# Patient Record
Sex: FEMALE | Race: WHITE | NOT HISPANIC OR LATINO | Employment: FULL TIME | URBAN - METROPOLITAN AREA
[De-identification: names, ages, dates, MRNs, and addresses within clinical notes are randomized per-mention and may not be internally consistent; named-entity substitution may affect disease eponyms.]

---

## 2017-01-02 ENCOUNTER — OFFICE VISIT (OUTPATIENT)
Dept: FAMILY MEDICINE | Facility: CLINIC | Age: 37
End: 2017-01-02
Payer: COMMERCIAL

## 2017-01-02 VITALS
BODY MASS INDEX: 21.09 KG/M2 | WEIGHT: 119 LBS | HEIGHT: 63 IN | HEART RATE: 66 BPM | DIASTOLIC BLOOD PRESSURE: 50 MMHG | OXYGEN SATURATION: 98 % | SYSTOLIC BLOOD PRESSURE: 86 MMHG | TEMPERATURE: 98.3 F

## 2017-01-02 DIAGNOSIS — Z30.40 ENCOUNTER FOR SURVEILLANCE OF CONTRACEPTIVES: ICD-10-CM

## 2017-01-02 DIAGNOSIS — Z00.00 ROUTINE HEALTH MAINTENANCE: Primary | ICD-10-CM

## 2017-01-02 PROCEDURE — 99395 PREV VISIT EST AGE 18-39: CPT | Performed by: INTERNAL MEDICINE

## 2017-01-02 RX ORDER — LEVONORGESTREL/ETHIN.ESTRADIOL 0.1-0.02MG
1 TABLET ORAL DAILY
Qty: 84 TABLET | Refills: 3 | Status: SHIPPED | OUTPATIENT
Start: 2017-01-02 | End: 2017-11-13

## 2017-01-02 NOTE — PROGRESS NOTES
"   SUBJECTIVE:     CC: Monica Quesada is an 36 year old woman who presents for preventive health visit.     Healthy Habits:    Do you get at least three servings of calcium containing foods daily (dairy, green leafy vegetables, etc.)? {YES/NO, DAIRY INTAKE:020729::\"yes\"}    Amount of exercise or daily activities, outside of work: {AMOUNT EXERCISE:190736}    Problems taking medications regularly {Yes /No default:685495::\"No\"}    Medication side effects: {Yes /No default.:315337::\"No\"}    Have you had an eye exam in the past two years? {YESNOBLANK:630134}    Do you see a dentist twice per year? {YESNOBLANK:790216}    Do you have sleep apnea, excessive snoring or daytime drowsiness?{YESNOBLANK:372732}    {Outside tests to abstract? :853753}    {additional problems to add:080183}    Today's PHQ-2 Score:   PHQ-2 ( 1999 Pfizer) 12/29/2016 11/23/2015   Q1: Little interest or pleasure in doing things - 0   Q2: Feeling down, depressed or hopeless - 0   PHQ-2 Score - 0   Little interest or pleasure in doing things Not at all -   Feeling down, depressed or hopeless Not at all -   PHQ-2 Score 0 -     {PHQ-2 LOOK IN ASSESSMENTS :885180}  Abuse: Current or Past(Physical, Sexual or Emotional)- {YES/NO/NA:042579}  Do you feel safe in your environment - {YES/NO/NA:004254}    Social History   Substance Use Topics     Smoking status: Never Smoker      Smokeless tobacco: Never Used     Alcohol Use: No      Comment: 1 per month     {ETOH AUDIT:462952}    Recent Labs   Lab Test  11/23/15   0732   CHOL  197   HDL  56   LDL  120*   TRIG  103   NHDL  141*       Reviewed orders with patient.  Reviewed health maintenance and updated orders accordingly - {Yes/No:543342::\"Yes\"}    Mammo Decision Support:  {Mammo:576393}    Pertinent mammograms are reviewed under the imaging tab.  History of abnormal Pap smear: {PAP HX:209299}  All Histories reviewed and updated in Epic.  {HISTORY OPTIONS:168949}    ROS:  {FEMALE PREVENTATIVE " "ROS:869351}    {CHRONICPROBDATA:335535}  OBJECTIVE:     There were no vitals taken for this visit.  EXAM:  {Exam Choices:041352}    ASSESSMENT/PLAN:     {Diag Picklist:102502}    COUNSELING:   {FEMALE COUNSELING MESSAGES:413909::\"Reviewed preventive health counseling, as reflected in patient instructions\"}    {Blood Pressure Screenin}     reports that she has never smoked. She has never used smokeless tobacco.  {Tobacco Cessation needed for ACO -- Delete if patient is a non-smoker:296708}  Estimated body mass index is 20.87 kg/(m^2) as calculated from the following:    Height as of 11/23/15: 5' 2.5\" (1.588 m).    Weight as of 11/23/15: 116 lb (52.617 kg).   {Weight Management Plan needed for ACO:982677}    Counseling Resources:  ATP IV Guidelines  Pooled Cohorts Equation Calculator  Breast Cancer Risk Calculator  FRAX Risk Assessment  ICSI Preventive Guidelines  Dietary Guidelines for Americans,   USDA's MyPlate  ASA Prophylaxis  Lung CA Screening    Lashell Etienne MD  [facility] [place]  "

## 2017-01-02 NOTE — NURSING NOTE
"Chief Complaint   Patient presents with     Physical     fasting     Initial BP 86/50 mmHg  Pulse 66  Temp(Src) 98.3  F (36.8  C) (Oral)  Ht 5' 2.5\" (1.588 m)  Wt 119 lb (53.978 kg)  BMI 21.41 kg/m2  SpO2 98%  LMP 12/08/2016 Estimated body mass index is 21.41 kg/(m^2) as calculated from the following:    Height as of this encounter: 5' 2.5\" (1.588 m).    Weight as of this encounter: 119 lb (53.978 kg).  BP completed using cuff size regular right Arm  Lacie Auuth CMA    "

## 2017-01-02 NOTE — PROGRESS NOTES
SUBJECTIVE:     CC: Monica Quesada is an 36 year old woman who presents for preventive health visit.     Monica lives with her  and two kids, ages 6 and 3.  She works as an  at [employer].      Physical  Annual:     Getting at least 3 servings of Calcium per day::  Yes    Bi-annual eye exam::  Yes    Dental care twice a year::  Yes    Sleep apnea or symptoms of sleep apnea::  None    Diet::  Regular (no restrictions)    Frequency of exercise::  4-5 days/week    Duration of exercise::  30-45 minutes    Taking medications regularly::  Yes    Medication side effects::  None    Additional concerns today::  No          Today's PHQ-2 Score:   PHQ-2 ( 1999 Pfizer) 1/2/2017   Q1: Little interest or pleasure in doing things 0   Q2: Feeling down, depressed or hopeless 0   PHQ-2 Score 0   Little interest or pleasure in doing things -   Feeling down, depressed or hopeless -   PHQ-2 Score -       Abuse: Current or Past(Physical, Sexual or Emotional)- No  Do you feel safe in your environment - Yes    Social History   Substance Use Topics     Smoking status: Never Smoker      Smokeless tobacco: Never Used     Alcohol Use: No      Comment: 1 per month     The patient does not drink >3 drinks per day nor >7 drinks per week.    Recent Labs   Lab Test  11/23/15   0732   CHOL  197   HDL  56   LDL  120*   TRIG  103   NHDL  141*       Reviewed orders with patient.  Reviewed health maintenance and updated orders accordingly - Yes    Mammo Decision Support:  Mammogram not appropriate for this patient based on age.    History of abnormal Pap smear: NO - age 30-65 PAP every 5 years with negative HPV co-testing recommended    All Histories reviewed and updated in Epic.      ROS:  C: NEGATIVE for fever, chills, change in weight  I: NEGATIVE for worrisome rashes, moles or lesions  E: NEGATIVE for vision changes or irritation  ENT: NEGATIVE for ear, mouth and throat problems  R: NEGATIVE for significant cough or  "SOB  B: NEGATIVE for masses, tenderness or discharge  CV: NEGATIVE for chest pain, palpitations or peripheral edema  GI: NEGATIVE for nausea, abdominal pain, heartburn, or change in bowel habits  : NEGATIVE for unusual urinary or vaginal symptoms. Periods are regular.  M: NEGATIVE for significant arthralgias or myalgia  N: NEGATIVE for weakness, dizziness or paresthesias  P: NEGATIVE for changes in mood or affect    Problem list, Medication list, Allergies, and Medical/Social/Surgical histories reviewed in [facility] and updated as appropriate.  OBJECTIVE:     BP 86/50 mmHg  Pulse 66  Temp(Src) 98.3  F (36.8  C) (Oral)  Ht 5' 2.5\" (1.588 m)  Wt 119 lb (53.978 kg)  BMI 21.41 kg/m2  SpO2 98%  LMP 12/08/2016  EXAM:  GENERAL: healthy, alert and no distress  EYES: Eyes grossly normal to inspection, PERRL and conjunctivae and sclerae normal  HENT: ear canals and TM's normal,  mouth without ulcers or lesions  NECK: no adenopathy, no asymmetry, masses, or scars and thyroid normal to palpation  RESP: lungs clear to auscultation - no rales, rhonchi or wheezes  CV: regular rate and rhythm, normal S1 S2, no S3 or S4, no murmur, click or rub, no peripheral edema and peripheral pulses strong  ABDOMEN: soft, nontender, no hepatosplenomegaly, no masses and bowel sounds normal  MS: no gross musculoskeletal defects noted, no edema  SKIN: no suspicious lesions or rashes  NEURO: Normal strength and tone, mentation intact and speech normal  PSYCH: mentation appears normal, affect normal/bright    ASSESSMENT/PLAN:         ICD-10-CM    1. Routine health maintenance Z00.00    2. Encounter for surveillance of contraceptives Z30.40 levonorgestrel-ethinyl estradiol (LUTERA) 0.1-20 MG-MCG per tablet       COUNSELING:  Reviewed preventive health counseling, as reflected in patient instructions       Regular exercise       Healthy diet/nutrition       Contraception - discussed alternative forms of hormonal and surgical birth control to OCP " "given that she and her  are done having children.  She will think about options and discuss with her .          reports that she has never smoked. She has never used smokeless tobacco.    Estimated body mass index is 21.41 kg/(m^2) as calculated from the following:    Height as of this encounter: 5' 2.5\" (1.588 m).    Weight as of this encounter: 119 lb (53.978 kg).       Counseling Resources:  ATP IV Guidelines  Pooled Cohorts Equation Calculator  Breast Cancer Risk Calculator  FRAX Risk Assessment  ICSI Preventive Guidelines  Dietary Guidelines for Americans, 2010  USDA's MyPlate  ASA Prophylaxis  Lung CA Screening    Lashell Etienne MD  [facility] [place]    "

## 2017-01-02 NOTE — PATIENT INSTRUCTIONS

## 2017-11-13 DIAGNOSIS — Z30.40 ENCOUNTER FOR SURVEILLANCE OF CONTRACEPTIVES: ICD-10-CM

## 2017-11-13 NOTE — TELEPHONE ENCOUNTER
Reason for Call:  Medication or medication refill:    Do you use a [facility]?  Name of the pharmacy and phone number for the current request:   [facility] [id number] [place] [address] [id number] [facility] [address]     Name of the medication requested: The patient says she will run out of birth control (Lutera) before her next appointment. She set up a physical with Dr. Lucero on 01/08/2018.     Can we leave a detailed message on this number? YES    Phone number patient can be reached at: Cell number on file:    Telephone Information:   Mobile [phone]     Best Time: Anytime    Call taken on 11/13/2017 at 2:44 PM by Ewelina Landin

## 2017-11-17 RX ORDER — LEVONORGESTREL/ETHIN.ESTRADIOL 0.1-0.02MG
1 TABLET ORAL DAILY
Qty: 84 TABLET | Refills: 0 | Status: SHIPPED | OUTPATIENT
Start: 2017-11-17 | End: 2018-01-08

## 2017-11-17 NOTE — TELEPHONE ENCOUNTER
Medication is being filled for 1 time refill only due to:  for coverage until next scheduled apt time   Next 5 appointments (look out 90 days)     Jan 08, 2018  8:00 AM CST   PHYSICAL with Shelbie Lucero MD   [facility] ([facility])    [address] [place] [id number]   [phone]                Lorrie Sullivan RN

## 2018-01-08 ENCOUNTER — OFFICE VISIT (OUTPATIENT)
Dept: FAMILY MEDICINE | Facility: CLINIC | Age: 38
End: 2018-01-08
Payer: COMMERCIAL

## 2018-01-08 VITALS
OXYGEN SATURATION: 99 % | SYSTOLIC BLOOD PRESSURE: 104 MMHG | TEMPERATURE: 98.6 F | HEART RATE: 79 BPM | HEIGHT: 63 IN | BODY MASS INDEX: 21.26 KG/M2 | DIASTOLIC BLOOD PRESSURE: 66 MMHG | WEIGHT: 120 LBS

## 2018-01-08 DIAGNOSIS — Z13.6 CARDIOVASCULAR SCREENING; LDL GOAL LESS THAN 160: ICD-10-CM

## 2018-01-08 DIAGNOSIS — Z00.00 ROUTINE GENERAL MEDICAL EXAMINATION AT A HEALTH CARE FACILITY: Primary | ICD-10-CM

## 2018-01-08 DIAGNOSIS — Z30.41 ENCOUNTER FOR SURVEILLANCE OF CONTRACEPTIVE PILLS: ICD-10-CM

## 2018-01-08 LAB
CHOLEST SERPL-MCNC: 218 MG/DL
GLUCOSE SERPL-MCNC: 86 MG/DL (ref 70–99)
HDLC SERPL-MCNC: 64 MG/DL
LDLC SERPL CALC-MCNC: 133 MG/DL
NONHDLC SERPL-MCNC: 154 MG/DL
TRIGL SERPL-MCNC: 106 MG/DL

## 2018-01-08 PROCEDURE — 87624 HPV HI-RISK TYP POOLED RSLT: CPT | Performed by: FAMILY MEDICINE

## 2018-01-08 PROCEDURE — 80061 LIPID PANEL: CPT | Performed by: FAMILY MEDICINE

## 2018-01-08 PROCEDURE — 36415 COLL VENOUS BLD VENIPUNCTURE: CPT | Performed by: FAMILY MEDICINE

## 2018-01-08 PROCEDURE — 82947 ASSAY GLUCOSE BLOOD QUANT: CPT | Performed by: FAMILY MEDICINE

## 2018-01-08 PROCEDURE — G0145 SCR C/V CYTO,THINLAYER,RESCR: HCPCS | Performed by: FAMILY MEDICINE

## 2018-01-08 PROCEDURE — 99395 PREV VISIT EST AGE 18-39: CPT | Performed by: FAMILY MEDICINE

## 2018-01-08 RX ORDER — LEVONORGESTREL/ETHIN.ESTRADIOL 0.1-0.02MG
1 TABLET ORAL DAILY
Qty: 84 TABLET | Refills: 3 | Status: SHIPPED | OUTPATIENT
Start: 2018-01-08 | End: 2019-01-26

## 2018-01-08 NOTE — MR AVS SNAPSHOT
After Visit Summary   1/8/2018    Monica Quesada    MRN: [id number]           Patient Information     Date Of Birth          1980        Visit Information        Provider Department      1/8/2018 8:00 AM Shelbie Lucero MD [facility]        Today's Diagnoses     Routine general medical examination at a health care facility    -  1    Encounter for surveillance of contraceptive pills        CARDIOVASCULAR SCREENING; LDL GOAL LESS THAN 160          Care Instructions      Preventive Health Recommendations  Female Ages 26 - 39  Yearly exam:   See your health care provider every year in order to    Review health changes.     Discuss preventive care.      Review your medicines if you your doctor has prescribed any.    Until age 30: Get a Pap test every three years (more often if you have had an abnormal result).    After age 30: Talk to your doctor about whether you should have a Pap test every 3 years or have a Pap test with HPV screening every 5 years.   You do not need a Pap test if your uterus was removed (hysterectomy) and you have not had cancer.  You should be tested each year for STDs (sexually transmitted diseases), if you're at risk.   Talk to your provider about how often to have your cholesterol checked.  If you are at risk for diabetes, you should have a diabetes test (fasting glucose).  Shots: Get a flu shot each year. Get a tetanus shot every 10 years.   Nutrition:     Eat at least 5 servings of fruits and vegetables each day.    Eat whole-grain bread, whole-wheat pasta and brown rice instead of white grains and rice.    Talk to your provider about Calcium and Vitamin D.     Lifestyle    Exercise at least 150 minutes a week (30 minutes a day, 5 days of the week). This will help you control your weight and prevent disease.    Limit alcohol to one drink per day.    No smoking.     Wear sunscreen to prevent skin cancer.    See your dentist every six months  "for an exam and cleaning.            Follow-ups after your visit        Who to contact     If you have questions or need follow up information about today's clinic visit or your schedule please contact [facility] [place] directly at [phone].  Normal or non-critical lab and imaging results will be communicated to you by Attivio[employer], letter or phone within 4 business days after the clinic has received the results. If you do not hear from us within 7 days, please contact the clinic through Attiviohart or phone. If you have a critical or abnormal lab result, we will notify you by phone as soon as possible.  Submit refill requests through [employer] or call your pharmacy and they will forward the refill request to us. Please allow 3 business days for your refill to be completed.          Additional Information About Your Visit        [employer] Information     [employer] gives you secure access to your electronic health record. If you see a primary care provider, you can also send messages to your care team and make appointments. If you have questions, please call your primary care clinic.  If you do not have a primary care provider, please call [phone] and they will assist you.        Care EveryWhere ID     This is your Care EveryWhere ID. This could be used by other organizations to access your [facility] medical records  [id number]        Your Vitals Were     Pulse Temperature Height Last Period Pulse Oximetry BMI (Body Mass Index)    79 98.6  F (37  C) (Tympanic) 5' 2.5\" (1.588 m) 01/03/2018 99% 21.6 kg/m2       Blood Pressure from Last 3 Encounters:   01/08/18 104/66   01/02/17 (!) 86/50   11/23/15 99/65    Weight from Last 3 Encounters:   01/08/18 120 lb (54.4 kg)   01/02/17 119 lb (54 kg)   11/23/15 116 lb (52.6 kg)              We Performed the Following     Glucose     HPV High Risk Types DNA Cervical     Lipid panel reflex to direct LDL Fasting     Pap imaged thin layer screen with HPV - recommended age 30 " - 65          Where to get your medicines      These medications were sent to [id number] - [place], [place] - [address] AT [facility] [place] [address]  [address] [place] [address]    Hours:  24-hours Phone:  [phone]     levonorgestrel-ethinyl estradiol 0.1-20 MG-MCG per tablet          Primary Care Provider Office Phone # Fax #    Shelbie Davy Lucero [phone]        [facility] [address]  [place] [address]        Equal Access to Services     [facility]: Hadii aad ku hadasho Soomaali, waaxda luqadaha, qaybta kaalmada adeegyada, waxcadence bauman haymariusz caruso . So [facility] [phone].    ATENCIÓN: Si habla español, tiene a barraza disposición servicios gratuitos de asistencia lingüística. [facility] [phone].    We comply with applicable federal civil rights laws and [place] laws. We do not discriminate on the basis of race, color, national origin, age, disability, sex, sexual orientation, or gender identity.            Thank you!     Thank you for choosing [facility] [place]  for your care. Our goal is always to provide you with excellent care. Hearing back from our patients is one way we can continue to improve our services. Please take a few minutes to complete the written survey that you may receive in the mail after your visit with us. Thank you!             Your Updated Medication List - Protect others around you: Learn how to safely use, store and throw away your medicines at www.disposemymeds.org.          This list is accurate as of: 1/8/18  8:37 AM.  Always use your most recent med list.                   Brand Name Dispense Instructions for use Diagnosis    levonorgestrel-ethinyl estradiol 0.1-20 MG-MCG per tablet    LUTERA    84 tablet    Take 1 tablet by mouth daily    Encounter for surveillance of contraceptive pills

## 2018-01-08 NOTE — NURSING NOTE
"Chief Complaint   Patient presents with     Physical       Initial /66  Pulse 79  Temp 98.6  F (37  C) (Tympanic)  Ht 5' 2.5\" (1.588 m)  Wt 120 lb (54.4 kg)  LMP 01/03/2018  SpO2 99%  BMI 21.6 kg/m2 Estimated body mass index is 21.6 kg/(m^2) as calculated from the following:    Height as of this encounter: 5' 2.5\" (1.588 m).    Weight as of this encounter: 120 lb (54.4 kg).  Medication Reconciliation: complete  "

## 2018-01-08 NOTE — PROGRESS NOTES
SUBJECTIVE:   CC: Monica Quesada is an 37 year old woman who presents for preventive health visit.     Healthy Habits:    Do you get at least three servings of calcium containing foods daily (dairy, green leafy vegetables, etc.)? yes    Amount of exercise or daily activities, outside of work: 4-6 day(s) per week    Problems taking medications regularly not applicable    Medication side effects: No    Have you had an eye exam in the past two years? yes    Do you see a dentist twice per year? yes    Do you have sleep apnea, excessive snoring or daytime drowsiness?no        PROBLEMS TO ADD ON...     Need refill on BCP. Working well and wish  to continue     Today's PHQ-2 Score:   PHQ-2 ( 1999 Pfizer) 1/8/2018 1/2/2017   Q1: Little interest or pleasure in doing things 0 0   Q2: Feeling down, depressed or hopeless 0 0   PHQ-2 Score 0 0   Q1: Little interest or pleasure in doing things - -   Q2: Feeling down, depressed or hopeless - -   PHQ-2 Score - -         Abuse: Current or Past(Physical, Sexual or Emotional)- No  Do you feel safe in your environment - Yes  Social History   Substance Use Topics     Smoking status: Never Smoker     Smokeless tobacco: Never Used     Alcohol use No      Comment: 1 per month     If you drink alcohol do you typically have >3 drinks per day or >7 drinks per week? No                     Reviewed orders with patient.  Reviewed health maintenance and updated orders accordingly - Yes  Patient Active Problem List   Diagnosis     Encounter for surveillance of contraceptives     CARDIOVASCULAR SCREENING; LDL GOAL LESS THAN 160     Past Surgical History:   Procedure Laterality Date     ADENOIDECTOMY  1990     EYE SURGERY  2016    Lasik     PE TUBES  1990     TONSILLECTOMY  4/2007    Bilateral tonsillectomy       Social History   Substance Use Topics     Smoking status: Never Smoker     Smokeless tobacco: Never Used     Alcohol use No      Comment: 1 per month     Family History   Problem  Relation Age of Onset     Genitourinary Problems Mother      hysterectomy for benign disease     Lipids Mother      Hypertension Mother      Hyperlipidemia Mother      Asthma Mother      Lipids Father      Hyperlipidemia Father      Blood Disease Maternal Grandmother      low B12     Depression Maternal Grandmother      depression     Thyroid Disease Maternal Grandmother      hypothyroidism     C.A.D. Maternal Grandfather      sudden death, mid 50s, smoker     CEREBROVASCULAR DISEASE Paternal Grandfather      onset age 70s     Lipids Sister      Genitourinary Problems Sister      kidney stones     Cardiovascular Maternal Aunt      DVT legs     Hyperlipidemia Sister      Breast Cancer No family hx of      Colon Cancer No family hx of              Mammogram not appropriate for this patient based on age.    Pertinent mammograms are reviewed under the imaging tab.  History of abnormal Pap smear: NO - age 30- 65 PAP every 3 years recommended    Reviewed and updated as needed this visit by clinical staff         Reviewed and updated as needed this visit by Provider        Past Medical History:   Diagnosis Date     Allergic rhinitis     on immunotherapy until      Chronic eustachian tube dysfunction      Chronic tonsillitis 2007    tonsillectomy     Mixed hearing loss, bilateral     mild     Mixed hyperlipidemia 2007    mild LDL elevation     Supervision of other normal pregnancy      - vaginal        ROS:  C: NEGATIVE for fever, chills, change in weight  I: NEGATIVE for worrisome rashes, moles or lesions  E: NEGATIVE for vision changes or irritation  ENT: NEGATIVE for ear, mouth and throat problems  R: NEGATIVE for significant cough or SOB  B: NEGATIVE for masses, tenderness or discharge  CV: NEGATIVE for chest pain, palpitations or peripheral edema  GI: NEGATIVE for nausea, abdominal pain, heartburn, or change in bowel habits  : NEGATIVE for unusual urinary or vaginal symptoms. Periods are regular.  M:  NEGATIVE for significant arthralgias or myalgia  N: NEGATIVE for weakness, dizziness or paresthesias  E: NEGATIVE for temperature intolerance, skin/hair changes  P: NEGATIVE for changes in mood or affect    OBJECTIVE:   There were no vitals taken for this visit.  EXAM:  GENERAL: healthy, alert and no distress  EYES: Eyes grossly normal to inspection, PERRL and conjunctivae and sclerae normal  HENT: ear canals and TM's normal, nose and mouth without ulcers or lesions  NECK: no adenopathy, no asymmetry, masses, or scars and thyroid normal to palpation  RESP: lungs clear to auscultation - no rales, rhonchi or wheezes  BREAST: normal without masses, tenderness or nipple discharge and no palpable axillary masses or adenopathy  CV: regular rate and rhythm, normal S1 S2, no S3 or S4, no murmur, click or rub, no peripheral edema and peripheral pulses strong  ABDOMEN: soft, nontender, no hepatosplenomegaly, no masses and bowel sounds normal   (female): normal female external genitalia, normal urethral meatus , vaginal mucosa pink, moist, well rugated and normal cervix, adnexae, and uterus without masses.  MS: no gross musculoskeletal defects noted, no edema  SKIN: no suspicious lesions or rashes  NEURO: Normal strength and tone, mentation intact and speech normal  PSYCH: mentation appears normal, affect normal/bright    ASSESSMENT/PLAN:   (Z00.00) Routine general medical examination at a health care facility  (primary encounter diagnosis)  Comment:   Plan: Lipid panel reflex to direct LDL Fasting, Pap         imaged thin layer screen with HPV - recommended        age 30 - 65, HPV High Risk Types DNA Cervical,         Glucose            (Z30.41) Encounter for surveillance of contraceptive pills  Comment:   Plan: levonorgestrel-ethinyl estradiol (LUTERA)         0.1-20 MG-MCG per tablet            (Z13.6) CARDIOVASCULAR SCREENING; LDL GOAL LESS THAN 160  Comment:   Plan: Lipid panel reflex to direct LDL Fasting         "      COUNSELING:   Reviewed preventive health counseling, as reflected in patient instructions       Regular exercise       Healthy diet/nutrition       Vision screening         reports that she has never smoked. She has never used smokeless tobacco.    Estimated body mass index is 21.42 kg/(m^2) as calculated from the following:    Height as of 1/2/17: 5' 2.5\" (1.588 m).    Weight as of 1/2/17: 119 lb (54 kg).         Counseling Resources:  ATP IV Guidelines  Pooled Cohorts Equation Calculator  Breast Cancer Risk Calculator  FRAX Risk Assessment  ICSI Preventive Guidelines  Dietary Guidelines for Americans, 2010  USDA's MyPlate  ASA Prophylaxis  Lung CA Screening    Shelbie Lucero MD  [facility]  "

## 2018-01-10 LAB
COPATH REPORT: NORMAL
PAP: NORMAL

## 2018-01-12 LAB
FINAL DIAGNOSIS: NORMAL
HPV HR 12 DNA CVX QL NAA+PROBE: NEGATIVE
HPV16 DNA SPEC QL NAA+PROBE: NEGATIVE
HPV18 DNA SPEC QL NAA+PROBE: NEGATIVE
SPECIMEN DESCRIPTION: NORMAL

## 2018-02-25 DIAGNOSIS — Z30.40 ENCOUNTER FOR SURVEILLANCE OF CONTRACEPTIVES: ICD-10-CM

## 2018-02-26 RX ORDER — LEVONORGESTREL AND ETHINYL ESTRADIOL 0.1-0.02MG
KIT ORAL
Qty: 84 TABLET | Refills: 0 | OUTPATIENT
Start: 2018-02-26

## 2018-02-26 NOTE — TELEPHONE ENCOUNTER
"Requested Prescriptions   Pending Prescriptions Disp Refills     AVIANE 0.1-20 MG-MCG per tablet [Pharmacy Med Name: AVIANE-28 TABLETS 28]  Last Written Prescription Date:  1/8/18  Last Fill Quantity: 84,  # refills: 3   Last office visit: 1/8/2018 with prescribing provider:  Nic   Future Office Visit:     84 tablet 0     Sig: TAKE 1 TABLET BY MOUTH DAILY    Contraceptives Protocol Failed    2/25/2018 10:04 AM       Failed - No positive pregnancy test in past 12 months       Passed - Patient is not a current smoker if age is 35 or older       Passed - Recent or future visit with authorizing provider's specialty    Patient had office visit in the last year or has a visit in the next 30 days with authorizing provider.  See \"Patient Info\" tab in inbasket, or \"Choose Columns\" in Meds & Orders section of the refill encounter.            Passed - No active pregnancy on record          "

## 2019-01-26 DIAGNOSIS — Z30.41 ENCOUNTER FOR SURVEILLANCE OF CONTRACEPTIVE PILLS: ICD-10-CM

## 2019-01-28 RX ORDER — LEVONORGESTREL AND ETHINYL ESTRADIOL 0.1-0.02MG
KIT ORAL
Qty: 28 TABLET | Refills: 0 | Status: SHIPPED | OUTPATIENT
Start: 2019-01-28 | End: 2019-02-11

## 2019-02-11 ENCOUNTER — TELEPHONE (OUTPATIENT)
Dept: OTHER | Facility: CLINIC | Age: 39
End: 2019-02-11

## 2019-02-11 ENCOUNTER — OFFICE VISIT (OUTPATIENT)
Dept: FAMILY MEDICINE | Facility: CLINIC | Age: 39
End: 2019-02-11
Payer: COMMERCIAL

## 2019-02-11 VITALS
TEMPERATURE: 98.5 F | OXYGEN SATURATION: 98 % | WEIGHT: 120 LBS | HEIGHT: 63 IN | HEART RATE: 69 BPM | SYSTOLIC BLOOD PRESSURE: 104 MMHG | BODY MASS INDEX: 21.26 KG/M2 | DIASTOLIC BLOOD PRESSURE: 60 MMHG

## 2019-02-11 DIAGNOSIS — I83.92: ICD-10-CM

## 2019-02-11 DIAGNOSIS — Z00.00 ANNUAL PHYSICAL EXAM: Primary | ICD-10-CM

## 2019-02-11 DIAGNOSIS — Z30.41 ENCOUNTER FOR SURVEILLANCE OF CONTRACEPTIVE PILLS: ICD-10-CM

## 2019-02-11 LAB
CHOLEST SERPL-MCNC: 205 MG/DL
GLUCOSE SERPL-MCNC: 97 MG/DL (ref 70–99)
HDLC SERPL-MCNC: 66 MG/DL
LDLC SERPL CALC-MCNC: 119 MG/DL
NONHDLC SERPL-MCNC: 139 MG/DL
TRIGL SERPL-MCNC: 98 MG/DL

## 2019-02-11 PROCEDURE — 80061 LIPID PANEL: CPT | Performed by: FAMILY MEDICINE

## 2019-02-11 PROCEDURE — 99395 PREV VISIT EST AGE 18-39: CPT | Performed by: FAMILY MEDICINE

## 2019-02-11 PROCEDURE — 82947 ASSAY GLUCOSE BLOOD QUANT: CPT | Performed by: FAMILY MEDICINE

## 2019-02-11 PROCEDURE — 36415 COLL VENOUS BLD VENIPUNCTURE: CPT | Performed by: FAMILY MEDICINE

## 2019-02-11 RX ORDER — LEVONORGESTREL/ETHIN.ESTRADIOL 0.1-0.02MG
1 TABLET ORAL DAILY
Qty: 28 TABLET | Refills: 2 | Status: SHIPPED | OUTPATIENT
Start: 2019-02-11 | End: 2019-05-25

## 2019-02-11 ASSESSMENT — MIFFLIN-ST. JEOR: SCORE: 1188.95

## 2019-02-11 NOTE — PROGRESS NOTES
SUBJECTIVE:   CC: Monica Quesada is an 38 year old woman who presents for preventive health visit.     Healthy Habits:    Do you get at least three servings of calcium containing foods daily (dairy, green leafy vegetables, etc.)? yes    Amount of exercise or daily activities, outside of work: 3-5 day(s) per week    Problems taking medications regularly No    Medication side effects: No    Have you had an eye exam in the past two years? yes    Do you see a dentist twice per year? yes    Do you have sleep apnea, excessive snoring or daytime drowsiness?no      Patient needs a refill on OCPs.  Has been just had a vasectomy.  She thinks she would need may be another 1-2 months more of the medication and then she would like to stop the medication.    Also reports of varicose veins on the left leg above and below the knee.  It does not swell or ache.  It itches in that area once in a while.  Her mother also has varicose veins.  Would like to see a vein specialist.    Today's PHQ-2 Score:   PHQ-2 ( 1999 [employer]) 2/11/2019 1/8/2018   Q1: Little interest or pleasure in doing things 0 0   Q2: Feeling down, depressed or hopeless 0 0   PHQ-2 Score 0 0   Q1: Little interest or pleasure in doing things - -   Q2: Feeling down, depressed or hopeless - -   PHQ-2 Score - -       Abuse: Current or Past(Physical, Sexual or Emotional)- No  Do you feel safe in your environment? Yes    Social History     Tobacco Use     Smoking status: Never Smoker     Smokeless tobacco: Never Used   Substance Use Topics     Alcohol use: No     Comment: 1 per month     If you drink alcohol do you typically have >3 drinks per day or >7 drinks per week? No                     Reviewed orders with patient.  Reviewed health maintenance and updated orders accordingly - Yes  Labs reviewed in EPIC  BP Readings from Last 3 Encounters:   02/11/19 104/60   01/08/18 104/66   01/02/17 (!) 86/50    Wt Readings from Last 3 Encounters:   02/11/19 54.4 kg (120 lb)    01/08/18 54.4 kg (120 lb)   01/02/17 54 kg (119 lb)                  Patient Active Problem List   Diagnosis     Encounter for surveillance of contraceptives     CARDIOVASCULAR SCREENING; LDL GOAL LESS THAN 160     Past Surgical History:   Procedure Laterality Date     ADENOIDECTOMY  1990     EYE SURGERY  2016    Lasik     PE TUBES  1990     TONSILLECTOMY  4/2007    Bilateral tonsillectomy       Social History     Tobacco Use     Smoking status: Never Smoker     Smokeless tobacco: Never Used   Substance Use Topics     Alcohol use: No     Comment: 1 per month     Family History   Problem Relation Age of Onset     Genitourinary Problems Mother         hysterectomy for benign disease     Lipids Mother      Hypertension Mother      Hyperlipidemia Mother      Asthma Mother      Lipids Father      Hyperlipidemia Father      Blood Disease Maternal Grandmother         low B12     Depression Maternal Grandmother         depression     Thyroid Disease Maternal Grandmother         hypothyroidism     C.A.D. Maternal Grandfather         sudden death, mid 50s, smoker     Cerebrovascular Disease Paternal Grandfather         onset age 70s     Lipids Sister      Genitourinary Problems Sister         kidney stones     Cardiovascular Maternal Aunt         DVT legs     Hyperlipidemia Sister      Breast Cancer No family hx of      Colon Cancer No family hx of          Current Outpatient Medications   Medication Sig Dispense Refill     levonorgestrel-ethinyl estradiol (LESSINA-28) 0.1-20 MG-MCG tablet Take 1 tablet by mouth daily 28 tablet 2     Allergies   Allergen Reactions     Penicillins      rash     Sulfa Drugs      rash       Mammogram not appropriate for this patient based on age.    Pertinent mammograms are reviewed under the imaging tab.  History of abnormal Pap smear: NO - age 30-65 PAP every 5 years with negative HPV co-testing recommended  PAP / HPV Latest Ref Rng & Units 1/8/2018 8/29/2008 8/7/2007   PAP - NIL NIL NIL  "  HPV 16 DNA NEG:Negative Negative - -   HPV 18 DNA NEG:Negative Negative - -   OTHER HR HPV NEG:Negative Negative - -     Reviewed and updated as needed this visit by clinical staff  Tobacco  Allergies  Meds  Problems  Med Hx  Surg Hx  Fam Hx  Soc Hx          Reviewed and updated as needed this visit by Provider  Tobacco  Allergies  Meds  Problems  Med Hx  Surg Hx  Fam Hx  Soc Hx             ROS:  CONSTITUTIONAL: NEGATIVE for fever, chills, change in weight  INTEGUMENTARU/SKIN: NEGATIVE for worrisome rashes, moles or lesions  EYES: NEGATIVE for vision changes or irritation  ENT: NEGATIVE for ear, mouth and throat problems  RESP: NEGATIVE for significant cough or SOB  BREAST: NEGATIVE for masses, tenderness or discharge  CV: NEGATIVE for chest pain, palpitations or peripheral edema  GI: NEGATIVE for nausea, abdominal pain, heartburn, or change in bowel habits  : NEGATIVE for unusual urinary or vaginal symptoms. Periods are regular.  MUSCULOSKELETAL: NEGATIVE for significant arthralgias or myalgia  NEURO: NEGATIVE for weakness, dizziness or paresthesias  PSYCHIATRIC: NEGATIVE for changes in mood or affect    OBJECTIVE:   /60   Pulse 69   Temp 98.5  F (36.9  C) (Tympanic)   Ht 1.593 m (5' 2.72\")   Wt 54.4 kg (120 lb)   LMP 01/31/2019   SpO2 98%   BMI 21.45 kg/m    EXAM:  GENERAL: healthy, alert and no distress  EYES: Eyes grossly normal to inspection, PERRL and conjunctivae and sclerae normal  HENT: ear canals and TM's normal, nose and mouth without ulcers or lesions  NECK: no adenopathy, no asymmetry, masses, or scars and thyroid normal to palpation  RESP: lungs clear to auscultation - no rales, rhonchi or wheezes  BREAST: normal without masses, tenderness or nipple discharge and no palpable axillary masses or adenopathy  CV: regular rate and rhythm, normal S1 S2, no S3 or S4, no murmur, click or rub, no peripheral edema and peripheral pulses strong  ABDOMEN: soft, nontender, no " "hepatosplenomegaly, no masses and bowel sounds normal  MS: no gross musculoskeletal defects noted, no edema  Left leg on the medial aspect above and below the knee, is noted to have prominent veins.  No bulging noted.  No  tenderness.  No swelling noted.  SKIN: no suspicious lesions or rashes  NEURO: Normal strength and tone, mentation intact and speech normal  PSYCH: mentation appears normal, affect normal/bright        ASSESSMENT/PLAN:   1. Annual physical exam  Screening fasting labs ordered.  - Lipid panel reflex to direct LDL Fasting  - Glucose    2. Encounter for surveillance of contraceptive pills  OCPs refilled.  - levonorgestrel-ethinyl estradiol (LESSINA-28) 0.1-20 MG-MCG tablet; Take 1 tablet by mouth daily  Dispense: 28 tablet; Refill: 2    3. Varicose veins of lower limb without ulcer or inflammation, left  Referring the patient to vascular medicine for evaluation management of varicose veins.  - VASCULAR REFERRAL    COUNSELING:   Reviewed preventive health counseling, as reflected in patient instructions       Regular exercise       Healthy diet/nutrition    BP Readings from Last 1 Encounters:   02/11/19 104/60     Estimated body mass index is 21.45 kg/m  as calculated from the following:    Height as of this encounter: 1.593 m (5' 2.72\").    Weight as of this encounter: 54.4 kg (120 lb).           reports that  has never smoked. she has never used smokeless tobacco.      Counseling Resources:  ATP IV Guidelines  Pooled Cohorts Equation Calculator  Breast Cancer Risk Calculator  FRAX Risk Assessment  ICSI Preventive Guidelines  Dietary Guidelines for Americans, 2010  USDA's MyPlate  ASA Prophylaxis  Lung CA Screening    Deng Santiago MD  [facility] [place]  "

## 2019-02-11 NOTE — TELEPHONE ENCOUNTER
"Referral received via EPIC \"in box\", per  guidelines referral forwarded to [facility].     Katelin Bowden, SUSANN, RN    "

## 2019-02-20 ENCOUNTER — TELEPHONE (OUTPATIENT)
Dept: VASCULAR SURGERY | Facility: CLINIC | Age: 39
End: 2019-02-20

## 2019-02-20 NOTE — TELEPHONE ENCOUNTER
Referral received from [facility] For Left Varicose Veins. Left message for pt. To call to schedule an appt. With [facility].Kady Mishra

## 2019-02-22 ENCOUNTER — OFFICE VISIT (OUTPATIENT)
Dept: FAMILY MEDICINE | Facility: CLINIC | Age: 39
End: 2019-02-22
Payer: COMMERCIAL

## 2019-02-22 VITALS — DIASTOLIC BLOOD PRESSURE: 69 MMHG | HEART RATE: 61 BPM | SYSTOLIC BLOOD PRESSURE: 107 MMHG

## 2019-02-22 DIAGNOSIS — D22.9 MULTIPLE BENIGN NEVI: ICD-10-CM

## 2019-02-22 DIAGNOSIS — L81.4 LENTIGINES: Primary | ICD-10-CM

## 2019-02-22 DIAGNOSIS — D18.01 CHERRY ANGIOMA: ICD-10-CM

## 2019-02-22 PROCEDURE — 99213 OFFICE O/P EST LOW 20 MIN: CPT | Performed by: PHYSICIAN ASSISTANT

## 2019-02-22 NOTE — LETTER
2019         RE: Monica Quesada  [address] [id number]        Dear Colleague,    Thank you for referring your patient, Monica Quesada, [place] [facility] Please see a copy of my visit note below.    HPI:  Monica Quesada is a 38 year old female patient here today for FBE. Has had moles on her trunk since she was younger with not change .  Patient states this has been present for years.  Patient reports the following symptoms: none .  Patient reports the following previous treatments: none.  Patient reports the following modifying factors: none.  Associated symptoms: none.  Patient has no other skin complaints today.  Remainder of the HPI, Meds, PMH, Allergies, FH, and SH was reviewed in chart.    Pertinent Hx:   No personal or family history of skin cancer    Past Medical History:   Diagnosis Date     Allergic rhinitis     on immunotherapy until      Chronic eustachian tube dysfunction      Chronic tonsillitis     tonsillectomy     Mixed hearing loss, bilateral     mild     Mixed hyperlipidemia     mild LDL elevation     Supervision of other normal pregnancy      - vaginal       Past Surgical History:   Procedure Laterality Date     ADENOIDECTOMY       EYE SURGERY      Lasik     PE TUBES       TONSILLECTOMY  2007    Bilateral tonsillectomy        Family History   Problem Relation Age of Onset     Genitourinary Problems Mother         hysterectomy for benign disease     Lipids Mother      Hypertension Mother      Hyperlipidemia Mother      Asthma Mother      Lipids Father      Hyperlipidemia Father      Blood Disease Maternal Grandmother         low B12     Depression Maternal Grandmother         depression     Thyroid Disease Maternal Grandmother         hypothyroidism     C.A.D. Maternal Grandfather         sudden death, mid 50s, smoker     Cerebrovascular Disease Paternal Grandfather         onset age  70s     Lipids Sister      Genitourinary Problems Sister         kidney stones     Cardiovascular Maternal Aunt         DVT legs     Hyperlipidemia Sister      Breast Cancer No family hx of      Colon Cancer No family hx of        Social History     Socioeconomic History     Marital status:      Spouse name: Laron     Number of children: 0     Years of education: 16     Highest education level: Not on file   Occupational History     Occupation:      Employer: [employer]   Social Needs     Financial resource strain: Not on file     Food insecurity:     Worry: Not on file     Inability: Not on file     Transportation needs:     Medical: Not on file     Non-medical: Not on file   Tobacco Use     Smoking status: Never Smoker     Smokeless tobacco: Never Used   Substance and Sexual Activity     Alcohol use: No     Comment: 1 per month     Drug use: No     Sexual activity: Yes     Partners: Male     Birth control/protection: Pill     Comment:  had a vasectomy   Lifestyle     Physical activity:     Days per week: Not on file     Minutes per session: Not on file     Stress: Not on file   Relationships     Social connections:     Talks on phone: Not on file     Gets together: Not on file     Attends Church service: Not on file     Active member of club or organization: Not on file     Attends meetings of clubs or organizations: Not on file     Relationship status: Not on file     Intimate partner violence:     Fear of current or ex partner: Not on file     Emotionally abused: Not on file     Physically abused: Not on file     Forced sexual activity: Not on file   Other Topics Concern      Service No     Blood Transfusions No     Caffeine Concern No     Comment: 2-3 cans diet coke per week     Occupational Exposure No     Hobby Hazards Yes     Comment: tennis     Sleep Concern No     Stress Concern Yes     Comment: planning a wedding     Weight Concern No     Special Diet No     Comment:  1 yogurt per day     Back Care No     Exercise Yes     Comment: cardio for 60 min 4-5 days per week     Bike Helmet No     Seat Belt Yes     Self-Exams Yes     Parent/sibling w/ CABG, MI or angioplasty before 65F 55M? No   Social History Narrative    Lives with .  2 kids, non smoker  7/2008.  Alcohol social , working full time , Family in [place].       Outpatient Encounter Medications as of 2/22/2019   Medication Sig Dispense Refill     levonorgestrel-ethinyl estradiol (LESSINA-28) 0.1-20 MG-MCG tablet Take 1 tablet by mouth daily 28 tablet 2     No facility-administered encounter medications on file as of 2/22/2019.        Review Of Systems:  Skin: As above  Eyes: negative  Ears/Nose/Throat: negative  Respiratory: No shortness of breath, dyspnea on exertion, cough, or hemoptysis  Cardiovascular: negative  Gastrointestinal: negative  Genitourinary: negative  Musculoskeletal: negative  Neurologic: negative  Psychiatric: negative  Hematologic/Lymphatic/Immunologic: negative  Endocrine: negative      Objective:     /69   Pulse 61   LMP 01/31/2019   Eyes: Conjunctivae/lids: Normal   ENT: Lips:  Normal  MSK: Normal  Cardiovascular: Peripheral edema none  Pulm: Breathing Normal  Neuro/Psych: Orientation: Normal; Mood/Affect: Normal, NAD, WDWN  Pt accompanied by: self  Following areas examined: Scalp, face, eyelids, lips, neck, chest, abdomen, back, buttocks, and R&L upper and lower extremities.      [place] skin type:i   Findings:  Red smooth well-defined macules on trunk and extremities.  Well circumscribed macules with symmetric color distribution on trunk and extremities 2-3mm  Tan WD smooth macules on face, neck, trunk, and extremities.    Assessment and Plan:  1) Lentigines, multiple benign nevi, cherry angiomas    Treatment of these lesions would be purely cosmetic and not medically neccessary.  Lesion may recur and/or may not completely resolve. May need additional treatment.   Different removal options including excision, cryotherapy, cautery and /or laser.      Signs and Symptoms of non-melanoma skin cancer and ABCDEs of melanoma reviewed with patient. Patient encouraged to perform monthly self skin exams and educated on how to perform them. UV precautions reviewed with patient. Patient was asked about new or changing moles/lesions on body.   Wear a sunscreen with at least SPF 30 on your face, ears, neck and V of the chest daily. Wear sunscreen on other areas of the body if those areas are exposed to the sun throughout the day. Sunscreens can contain physical and/or chemical blockers. Physical blockers are less likely to clog pores, these include zinc oxide and titanium dioxide. Reapply every two hour and after swimming. Sunscreen examples include Neutrogena, CeraVe, Blue Lizard, Elta MD and many others.    Proper skin care from [facility]:    -Eliminate harsh soaps as they strip the natural oils from the skin, often resulting in dry itchy skin ( i.e. Dial, Zest, [place])  -Use mild soaps such as Cetaphil or Dove Sensitive Skin in the shower. You do not need to use soap on arms, legs, and trunk every time you shower unless visibly soiled.   -Avoid hot or cold showers.  -After showering, lightly dry off and apply moisturizing within 2-3 minutes. This will help trap moisture in the skin.   -Aggressive use of a moisturizer at least 1-2 times a day to the entire body (including -Vanicream, Cetaphil, Aquaphor or Cerave) and moisturize hands after every washing.  -We recommend using moisturizers that come in a tub that needs to be scooped out, not a pump. This has more of an oil base. It will hold moisture in your skin much better than a water base moisturizer. The above recommended are non-pore clogging.               Follow up in yearly FBE      Again, thank you for allowing me to participate in the care of your patient.        Sincerely,        Neelam Weaver PA-C

## 2019-02-22 NOTE — PATIENT INSTRUCTIONS
Proper skin care from [facility]:    -Eliminate harsh soaps as they strip the natural oils from the skin, often resulting in dry itchy skin ( i.e. Dial, Zest, [place])  -Use mild soaps such as Cetaphil or Dove Sensitive Skin in the shower. You do not need to use soap on arms, legs, and trunk every time you shower unless visibly soiled.   -Avoid hot or cold showers.  -After showering, lightly dry off and apply moisturizing within 2-3 minutes. This will help trap moisture in the skin.   -Aggressive use of a moisturizer at least 1-2 times a day to the entire body (including -Vanicream, Cetaphil, Aquaphor or Cerave) and moisturize hands after every washing.  -We recommend using moisturizers that come in a tub that needs to be scooped out, not a pump. This has more of an oil base. It will hold moisture in your skin much better than a water base moisturizer. The above recommended are non-pore clogging.           Wear a sunscreen with at least SPF 30 on your face, ears, neck and V of the chest daily. Wear sunscreen on other areas of the body if those areas are exposed to the sun throughout the day. Sunscreens can contain physical and/or chemical blockers. Physical blockers are less likely to clog pores, these include zinc oxide and titanium dioxide. Reapply every two hour and after swimming. Sunscreen examples include Neutrogena, CeraVe, Blue Lizard, Elta MD and many others.    UV radiation  UVA radiation remains constant throughout the day and throughout the year. It is a longer wavelength than UVB and therefore penetrates deeper into the skin leading to immediate and delayed tanning, photoaging, and skin cancer. 70-80% of UVA and UVB radiation occurs between the hours of 10am-2pm.  UVB radiation  UVB radiation causes the most harmful effects and is more significant during the summer months. However, snow and ice can reflect UVB radiation leading to skin damage during the winter months as well. UVB  radiation is responsible for tanning, burning, inflammation, delayed erythema (pinkness), pigmentation (brown spots), and skin cancer.   Just because you do not burn or are not developing a tan does not mean that you are not damaging your skin. A 15 minute drive to and from work for 30 years an lead to chronic sun damage of the skin. It is important to wear a broad spectrum (both UVA and UVB) sunscreen EVERY day with at least 30 SPF. Apply to face, ears, neck and v of the chest as this is where most of our sun exposure is. Reapply sunscreen every two hours if you plan on being outside.   Bert Guadarrama. Clinical Dermatology: A Color Guide to Diagnosis and Therapy. Elsevier, 2016.

## 2019-02-22 NOTE — PROGRESS NOTES
[facility] faxed request for HCTZ 25 mg.  Pt has a pending CPE in Aug 2017.  Per Dr. Gan's protocol meds were refilled.       HPI:  Monica Quesada is a 38 year old female patient here today for FBE. Has had moles on her trunk since she was younger with not change .  Patient states this has been present for years.  Patient reports the following symptoms: none .  Patient reports the following previous treatments: none.  Patient reports the following modifying factors: none.  Associated symptoms: none.  Patient has no other skin complaints today.  Remainder of the HPI, Meds, PMH, Allergies, FH, and SH was reviewed in chart.    Pertinent Hx:   No personal or family history of skin cancer    Past Medical History:   Diagnosis Date     Allergic rhinitis     on immunotherapy until      Chronic eustachian tube dysfunction      Chronic tonsillitis     tonsillectomy     Mixed hearing loss, bilateral     mild     Mixed hyperlipidemia     mild LDL elevation     Supervision of other normal pregnancy      - vaginal       Past Surgical History:   Procedure Laterality Date     ADENOIDECTOMY       EYE SURGERY  2016    Lasik     PE TUBES       TONSILLECTOMY  2007    Bilateral tonsillectomy        Family History   Problem Relation Age of Onset     Genitourinary Problems Mother         hysterectomy for benign disease     Lipids Mother      Hypertension Mother      Hyperlipidemia Mother      Asthma Mother      Lipids Father      Hyperlipidemia Father      Blood Disease Maternal Grandmother         low B12     Depression Maternal Grandmother         depression     Thyroid Disease Maternal Grandmother         hypothyroidism     C.A.D. Maternal Grandfather         sudden death, mid 50s, smoker     Cerebrovascular Disease Paternal Grandfather         onset age 70s     Lipids Sister      Genitourinary Problems Sister         kidney stones     Cardiovascular Maternal Aunt         DVT legs     Hyperlipidemia Sister      Breast Cancer No family hx of      Colon Cancer No family hx of        Social History     Socioeconomic  History     Marital status:      Spouse name: Laron     Number of children: 0     Years of education: 16     Highest education level: Not on file   Occupational History     Occupation:      Employer: [employer]   Social Needs     Financial resource strain: Not on file     Food insecurity:     Worry: Not on file     Inability: Not on file     Transportation needs:     Medical: Not on file     Non-medical: Not on file   Tobacco Use     Smoking status: Never Smoker     Smokeless tobacco: Never Used   Substance and Sexual Activity     Alcohol use: No     Comment: 1 per month     Drug use: No     Sexual activity: Yes     Partners: Male     Birth control/protection: Pill     Comment:  had a vasectomy   Lifestyle     Physical activity:     Days per week: Not on file     Minutes per session: Not on file     Stress: Not on file   Relationships     Social connections:     Talks on phone: Not on file     Gets together: Not on file     Attends Restorationist service: Not on file     Active member of club or organization: Not on file     Attends meetings of clubs or organizations: Not on file     Relationship status: Not on file     Intimate partner violence:     Fear of current or ex partner: Not on file     Emotionally abused: Not on file     Physically abused: Not on file     Forced sexual activity: Not on file   Other Topics Concern      Service No     Blood Transfusions No     Caffeine Concern No     Comment: 2-3 cans diet coke per week     Occupational Exposure No     Hobby Hazards Yes     Comment: tennis     Sleep Concern No     Stress Concern Yes     Comment: planning a wedding     Weight Concern No     Special Diet No     Comment: 1 yogurt per day     Back Care No     Exercise Yes     Comment: cardio for 60 min 4-5 days per week     Bike Helmet No     Seat Belt Yes     Self-Exams Yes     Parent/sibling w/ CABG, MI or angioplasty before 65F 55M? No   Social History Narrative    Lives with  .  2 kids, non smoker  7/2008.  Alcohol social , working full time , Family in [facility].       Outpatient Encounter Medications as of 2/22/2019   Medication Sig Dispense Refill     levonorgestrel-ethinyl estradiol (LESSINA-28) 0.1-20 MG-MCG tablet Take 1 tablet by mouth daily 28 tablet 2     No facility-administered encounter medications on file as of 2/22/2019.        Review Of Systems:  Skin: As above  Eyes: negative  Ears/Nose/Throat: negative  Respiratory: No shortness of breath, dyspnea on exertion, cough, or hemoptysis  Cardiovascular: negative  Gastrointestinal: negative  Genitourinary: negative  Musculoskeletal: negative  Neurologic: negative  Psychiatric: negative  Hematologic/Lymphatic/Immunologic: negative  Endocrine: negative      Objective:     /69   Pulse 61   LMP 01/31/2019   Eyes: Conjunctivae/lids: Normal   ENT: Lips:  Normal  MSK: Normal  Cardiovascular: Peripheral edema none  Pulm: Breathing Normal  Neuro/Psych: Orientation: Normal; Mood/Affect: Normal, NAD, WDWN  Pt accompanied by: self  Following areas examined: Scalp, face, eyelids, lips, neck, chest, abdomen, back, buttocks, and R&L upper and lower extremities.      [place] skin type:i   Findings:  Red smooth well-defined macules on trunk and extremities.  Well circumscribed macules with symmetric color distribution on trunk and extremities 2-3mm  Tan WD smooth macules on face, neck, trunk, and extremities.    Assessment and Plan:  1) Lentigines, multiple benign nevi, cherry angiomas    Treatment of these lesions would be purely cosmetic and not medically neccessary.  Lesion may recur and/or may not completely resolve. May need additional treatment.  Different removal options including excision, cryotherapy, cautery and /or laser.      Signs and Symptoms of non-melanoma skin cancer and ABCDEs of melanoma reviewed with patient. Patient encouraged to perform monthly self skin exams and educated on how to perform them.  UV precautions reviewed with patient. Patient was asked about new or changing moles/lesions on body.   Wear a sunscreen with at least SPF 30 on your face, ears, neck and V of the chest daily. Wear sunscreen on other areas of the body if those areas are exposed to the sun throughout the day. Sunscreens can contain physical and/or chemical blockers. Physical blockers are less likely to clog pores, these include zinc oxide and titanium dioxide. Reapply every two hour and after swimming. Sunscreen examples include Neutrogena, CeraVe, Blue Lizard, Elta MD and many others.    Proper skin care from [facility]:    -Eliminate harsh soaps as they strip the natural oils from the skin, often resulting in dry itchy skin ( i.e. Dial, Zest, Surinamese Spring)  -Use mild soaps such as Cetaphil or Dove Sensitive Skin in the shower. You do not need to use soap on arms, legs, and trunk every time you shower unless visibly soiled.   -Avoid hot or cold showers.  -After showering, lightly dry off and apply moisturizing within 2-3 minutes. This will help trap moisture in the skin.   -Aggressive use of a moisturizer at least 1-2 times a day to the entire body (including -Vanicream, Cetaphil, Aquaphor or Cerave) and moisturize hands after every washing.  -We recommend using moisturizers that come in a tub that needs to be scooped out, not a pump. This has more of an oil base. It will hold moisture in your skin much better than a water base moisturizer. The above recommended are non-pore clogging.               Follow up in yearly FBE

## 2019-03-26 ENCOUNTER — APPOINTMENT (OUTPATIENT)
Dept: VASCULAR SURGERY | Facility: CLINIC | Age: 39
End: 2019-03-26
Payer: COMMERCIAL

## 2019-03-26 PROCEDURE — 99207 ZZC VEINSOLUTIONS FREE SCREENING: CPT | Performed by: SURGERY

## 2019-05-25 DIAGNOSIS — Z30.41 ENCOUNTER FOR SURVEILLANCE OF CONTRACEPTIVE PILLS: ICD-10-CM

## 2019-05-25 NOTE — TELEPHONE ENCOUNTER
"  Per last note 2/11/19:    Patient needs a refill on OCPs.  Has been just had a vasectomy.  She thinks she would need may be another 1-2 months more of the medication and then she would like to stop the medication.    Need to call and ask if patient requested this or is it auto refill    Last Written Prescription Date:  2/11/19  Last Fill Quantity: 28,  # refills: 2   Last office visit: 2/22/2019 with prescribing provider:   Future Office Visit:    Requested Prescriptions   Pending Prescriptions Disp Refills     VIENVA 0.1-20 MG-MCG tablet [Pharmacy Med Name: VIENVA 0.1MG/0.02MG TABS 28S] 84 tablet 0     Sig: TAKE 1 TABLET BY MOUTH DAILY       Contraceptives Protocol Passed - 5/25/2019 10:16 AM        Passed - Patient is not a current smoker if age is 35 or older        Passed - Recent (12 mo) or future (30 days) visit within the authorizing provider's specialty     Patient had office visit in the last 12 months or has a visit in the next 30 days with authorizing provider or within the authorizing provider's specialty.  See \"Patient Info\" tab in inbasket, or \"Choose Columns\" in Meds & Orders section of the refill encounter.              Passed - Medication is active on med list        Passed - No active pregnancy on record        Passed - No positive pregnancy test in past 12 months          "

## 2019-05-28 NOTE — TELEPHONE ENCOUNTER
Left message on answering machine for patient to call back.      Monserrat Casper,RN BSN  [facility]  [phone]

## 2019-06-03 RX ORDER — TIMOLOL MALEATE 5 MG/ML
SOLUTION/ DROPS OPHTHALMIC
Qty: 28 TABLET | Refills: 0 | Status: SHIPPED | OUTPATIENT
Start: 2019-06-03 | End: 2021-06-07

## 2019-11-07 ENCOUNTER — HEALTH MAINTENANCE LETTER (OUTPATIENT)
Age: 39
End: 2019-11-07

## 2020-11-29 ENCOUNTER — HEALTH MAINTENANCE LETTER (OUTPATIENT)
Age: 40
End: 2020-11-29

## 2021-04-14 NOTE — TELEPHONE ENCOUNTER
"Requested Prescriptions   Pending Prescriptions Disp Refills     LESSINA-28 0.1-20 MG-MCG tablet [Pharmacy Med Name: LESSINA TABLETS 28]  Last Written Prescription Date:  1/8/18  Last Fill Quantity: 84,  # refills: 3   Last office visit: 1/8/2018 with prescribing provider:  Nic   Future Office Visit:     84 tablet 0     Sig: TAKE 1 TABLET BY MOUTH DAILY.    Contraceptives Protocol Failed - 1/26/2019  4:07 AM       Failed - Recent (12 mo) or future (30 days) visit within the authorizing provider's specialty    Patient had office visit in the last 12 months or has a visit in the next 30 days with authorizing provider or within the authorizing provider's specialty.  See \"Patient Info\" tab in inbasket, or \"Choose Columns\" in Meds & Orders section of the refill encounter.             Passed - Patient is not a current smoker if age is 35 or older       Passed - Medication is active on med list       Passed - No active pregnancy on record       Passed - No positive pregnancy test in past 12 months          " No

## 2021-06-07 ENCOUNTER — OFFICE VISIT (OUTPATIENT)
Dept: FAMILY MEDICINE | Facility: CLINIC | Age: 41
End: 2021-06-07
Payer: COMMERCIAL

## 2021-06-07 VITALS
HEART RATE: 69 BPM | HEIGHT: 63 IN | BODY MASS INDEX: 20.8 KG/M2 | WEIGHT: 117.4 LBS | TEMPERATURE: 98.5 F | OXYGEN SATURATION: 99 % | SYSTOLIC BLOOD PRESSURE: 104 MMHG | DIASTOLIC BLOOD PRESSURE: 66 MMHG

## 2021-06-07 DIAGNOSIS — Z12.31 BREAST CANCER SCREENING BY MAMMOGRAM: ICD-10-CM

## 2021-06-07 DIAGNOSIS — Z11.59 NEED FOR HEPATITIS C SCREENING TEST: ICD-10-CM

## 2021-06-07 DIAGNOSIS — Z00.00 ROUTINE GENERAL MEDICAL EXAMINATION AT A HEALTH CARE FACILITY: Primary | ICD-10-CM

## 2021-06-07 PROCEDURE — 99396 PREV VISIT EST AGE 40-64: CPT | Performed by: FAMILY MEDICINE

## 2021-06-07 RX ORDER — FLUTICASONE PROPIONATE 50 MCG
1 SPRAY, SUSPENSION (ML) NASAL DAILY
COMMUNITY

## 2021-06-07 ASSESSMENT — MIFFLIN-ST. JEOR: SCORE: 1167.68

## 2021-06-07 NOTE — PROGRESS NOTES
SUBJECTIVE:   CC: Monica Quesada is an 40 year old woman who presents for preventive health visit.         Healthy Habits:    Do you get at least three servings of calcium containing foods daily (dairy, green leafy vegetables, etc.)? yes    Amount of exercise or daily activities, outside of work: 6 day(s) per week    Problems taking medications regularly No    Medication side effects: No    Have you had an eye exam in the past two years? no    Do you see a dentist twice per year? yes    Do you have sleep apnea, excessive snoring or daytime drowsiness?no      PROBLEMS TO ADD ON...    Today's PHQ-2 Score:   PHQ-2 ( 1999 [employer]) 2/11/2019 1/8/2018   Q1: Little interest or pleasure in doing things 0 0   Q2: Feeling down, depressed or hopeless 0 0   PHQ-2 Score 0 0   Q1: Little interest or pleasure in doing things - -   Q2: Feeling down, depressed or hopeless - -   PHQ-2 Score - -       Abuse: Current or Past(Physical, Sexual or Emotional)- No  Do you feel safe in your environment? Yes    Have you ever done Advance Care Planning? (For example, a Health Directive, POLST, or a discussion with a medical provider or your loved ones about your wishes): No, advance care planning information given to patient to review.  Patient plans to discuss their wishes with loved ones or provider.      Social History     Tobacco Use     Smoking status: Never Smoker     Smokeless tobacco: Never Used   Substance Use Topics     Alcohol use: No     Comment: 1 per month     If you drink alcohol do you typically have >3 drinks per day or >7 drinks per week? No                     Reviewed orders with patient.  Reviewed health maintenance and updated orders accordingly - Yes  Patient Active Problem List   Diagnosis     Encounter for surveillance of contraceptives     CARDIOVASCULAR SCREENING; LDL GOAL LESS THAN 160     Past Surgical History:   Procedure Laterality Date     ADENOIDECTOMY  1990     EYE SURGERY  2016    Lasik     PE TUBES   1990     TONSILLECTOMY  4/2007    Bilateral tonsillectomy       Social History     Tobacco Use     Smoking status: Never Smoker     Smokeless tobacco: Never Used   Substance Use Topics     Alcohol use: No     Comment: 1 per month     Family History   Problem Relation Age of Onset     Genitourinary Problems Mother         hysterectomy for benign disease     Lipids Mother      Hypertension Mother      Hyperlipidemia Mother      Asthma Mother      Lipids Father      Hyperlipidemia Father      Blood Disease Maternal Grandmother         low B12     Depression Maternal Grandmother         depression     Thyroid Disease Maternal Grandmother         hypothyroidism     C.A.D. Maternal Grandfather         sudden death, mid 50s, smoker     Cerebrovascular Disease Paternal Grandfather         onset age 70s     Lipids Sister      Genitourinary Problems Sister         kidney stones     Cardiovascular Maternal Aunt         DVT legs     Hyperlipidemia Sister      Breast Cancer No family hx of      Colon Cancer No family hx of            FSH-7: No flowsheet data found.    Mammogram Screening - Offered annual screening and updated Health Maintenance for mutual plan based on risk factor consideration    Pertinent mammograms are reviewed under the imaging tab.    Pertinent mammograms are reviewed under the imaging tab.  History of abnormal Pap smear: NO - age 30- 65 PAP every 3 years recommended  PAP / HPV Latest Ref Rng & Units 1/8/2018 8/29/2008 8/7/2007   PAP - NIL NIL NIL   HPV 16 DNA NEG:Negative Negative - -   HPV 18 DNA NEG:Negative Negative - -   OTHER HR HPV NEG:Negative Negative - -     Reviewed and updated as needed this visit by clinical staff  Tobacco  Allergies  Meds   Med Hx  Surg Hx  Fam Hx  Soc Hx        Reviewed and updated as needed this visit by Provider                Past Medical History:   Diagnosis Date     Allergic rhinitis     on immunotherapy until 2005     Chronic eustachian tube dysfunction       Chronic tonsillitis 2007    tonsillectomy     Mixed hearing loss, bilateral     mild     Mixed hyperlipidemia 2007    mild LDL elevation     Supervision of other normal pregnancy      - vaginal        ROS:  CONSTITUTIONAL: NEGATIVE for fever, chills, change in weight  INTEGUMENTARU/SKIN: NEGATIVE for worrisome rashes, moles or lesions  EYES: NEGATIVE for vision changes or irritation  ENT: NEGATIVE for ear, mouth and throat problems  RESP: NEGATIVE for significant cough or SOB  BREAST: NEGATIVE for masses, tenderness or discharge  CV: NEGATIVE for chest pain, palpitations or peripheral edema  GI: NEGATIVE for nausea, abdominal pain, heartburn, or change in bowel habits  : NEGATIVE for unusual urinary or vaginal symptoms. Periods are regular.  had vasectomy so she has stopped taking BCP cycle regular, first day is bit heavy but then rest of days are ok, average 4-5 day , once a month   MUSCULOSKELETAL: NEGATIVE for significant arthralgias or myalgia  NEURO: NEGATIVE for weakness, dizziness or paresthesias  ENDOCRINE: NEGATIVE for temperature intolerance, skin/hair changes  HEME/ALLERGY/IMMUNE: NEGATIVE for bleeding problems  PSYCHIATRIC: NEGATIVE for changes in mood or affect    OBJECTIVE:   There were no vitals taken for this visit.  EXAM:  GENERAL: healthy, alert and no distress  EYES: Eyes grossly normal to inspection, PERRL and conjunctivae and sclerae normal  HENT: ear canals and TM's normal, nose and mouth without ulcers or lesions  NECK: no adenopathy, no asymmetry, masses, or scars and thyroid normal to palpation  RESP: lungs clear to auscultation - no rales, rhonchi or wheezes  BREAST: normal without masses, tenderness or nipple discharge and no palpable axillary masses or adenopathy  CV: regular rate and rhythm, normal S1 S2, no S3 or S4, no murmur, click or rub, no peripheral edema and peripheral pulses strong  ABDOMEN: soft, nontender, no hepatosplenomegaly, no masses and bowel sounds  "normal   (female): deferred  RECTAL: deferred  MS: no gross musculoskeletal defects noted, no edema  SKIN: no suspicious lesions or rashes  NEURO: Normal strength and tone, mentation intact and speech normal  PSYCH: mentation appears normal, affect normal/bright        ASSESSMENT/PLAN:   (Z00.00) Routine general medical examination at a health care facility  (primary encounter diagnosis)  Comment:   Plan: GLUCOSE, Lipid panel reflex to direct LDL         Fasting, *MA Screening Digital Bilateral            (Z11.59) Need for hepatitis C screening test  Comment:   Plan: Hepatitis C Screen Reflex to HCV RNA Quant and         Genotype            (Z12.31) Breast cancer screening by mammogram  Comment:   Plan: *MA Screening Digital Bilateral            COUNSELING:   Reviewed preventive health counseling, as reflected in patient instructions       Regular exercise       Healthy diet/nutrition       Vision screening       Immunizations    Vaccinated for: covid             Osteoporosis prevention/bone health       Consider Hep C screening for all patients one time for ages 18-79 years    Estimated body mass index is 21.45 kg/m  as calculated from the following:    Height as of 2/11/19: 1.593 m (5' 2.72\").    Weight as of 2/11/19: 54.4 kg (120 lb).        She reports that she has never smoked. She has never used smokeless tobacco.      Counseling Resources:  ATP IV Guidelines  Pooled Cohorts Equation Calculator  Breast Cancer Risk Calculator  BRCA-Related Cancer Risk Assessment: FHS-7 Tool  FRAX Risk Assessment  ICSI Preventive Guidelines  Dietary Guidelines for Americans, 2010  USDA's MyPlate  ASA Prophylaxis  Lung CA Screening    Shelbie Lucero MD  [facility]  "

## 2021-06-23 DIAGNOSIS — Z00.00 ROUTINE GENERAL MEDICAL EXAMINATION AT A HEALTH CARE FACILITY: ICD-10-CM

## 2021-06-23 DIAGNOSIS — Z11.59 NEED FOR HEPATITIS C SCREENING TEST: ICD-10-CM

## 2021-06-23 LAB
CHOLEST SERPL-MCNC: 217 MG/DL
GLUCOSE SERPL-MCNC: 91 MG/DL (ref 70–99)
HCV AB SERPL QL IA: NONREACTIVE
HDLC SERPL-MCNC: 76 MG/DL
LDLC SERPL CALC-MCNC: 131 MG/DL
NONHDLC SERPL-MCNC: 141 MG/DL
TRIGL SERPL-MCNC: 50 MG/DL

## 2021-06-23 PROCEDURE — 86803 HEPATITIS C AB TEST: CPT | Performed by: FAMILY MEDICINE

## 2021-06-23 PROCEDURE — 36415 COLL VENOUS BLD VENIPUNCTURE: CPT | Performed by: FAMILY MEDICINE

## 2021-06-23 PROCEDURE — 82947 ASSAY GLUCOSE BLOOD QUANT: CPT | Performed by: FAMILY MEDICINE

## 2021-06-23 PROCEDURE — 80061 LIPID PANEL: CPT | Performed by: FAMILY MEDICINE

## 2021-06-29 DIAGNOSIS — Z00.00 ROUTINE GENERAL MEDICAL EXAMINATION AT A HEALTH CARE FACILITY: ICD-10-CM

## 2021-06-29 DIAGNOSIS — Z12.31 BREAST CANCER SCREENING BY MAMMOGRAM: ICD-10-CM

## 2021-06-29 PROCEDURE — 77063 BREAST TOMOSYNTHESIS BI: CPT | Mod: TC | Performed by: RADIOLOGY

## 2021-06-29 PROCEDURE — 77067 SCR MAMMO BI INCL CAD: CPT | Mod: TC | Performed by: RADIOLOGY

## 2021-07-16 ENCOUNTER — OFFICE VISIT (OUTPATIENT)
Dept: FAMILY MEDICINE | Facility: CLINIC | Age: 41
End: 2021-07-16
Payer: COMMERCIAL

## 2021-07-16 VITALS — DIASTOLIC BLOOD PRESSURE: 60 MMHG | SYSTOLIC BLOOD PRESSURE: 110 MMHG

## 2021-07-16 DIAGNOSIS — L81.4 LENTIGINES: ICD-10-CM

## 2021-07-16 DIAGNOSIS — D22.9 MULTIPLE BENIGN NEVI: Primary | ICD-10-CM

## 2021-07-16 DIAGNOSIS — D48.5 NEOPLASM OF UNCERTAIN BEHAVIOR OF SKIN: ICD-10-CM

## 2021-07-16 DIAGNOSIS — L85.3 XEROSIS OF SKIN: ICD-10-CM

## 2021-07-16 DIAGNOSIS — D18.01 CHERRY ANGIOMA: ICD-10-CM

## 2021-07-16 DIAGNOSIS — L82.1 SEBORRHEIC KERATOSES: ICD-10-CM

## 2021-07-16 PROCEDURE — 99214 OFFICE O/P EST MOD 30 MIN: CPT | Mod: 25 | Performed by: PHYSICIAN ASSISTANT

## 2021-07-16 PROCEDURE — 11102 TANGNTL BX SKIN SINGLE LES: CPT | Performed by: PHYSICIAN ASSISTANT

## 2021-07-16 RX ORDER — TRIAMCINOLONE ACETONIDE 0.25 MG/G
CREAM TOPICAL
Qty: 15 G | Refills: 0 | Status: SHIPPED | OUTPATIENT
Start: 2021-07-16 | End: 2022-07-06

## 2021-07-16 NOTE — LETTER
2021         RE: Monica Quesada  [address] [facility] / [facility] [id number]        Dear Colleague,    Thank you for referring your patient, Monica Quesada, to the [facility]. Please see a copy of my visit note below.    HPI:  Monica Quesada is a 40 year old female patient here today for mole on abdomen .  Patient states this has been present for years.  Patient reports the following symptoms: none .  Patient reports the following previous treatments: none.  Patient reports the following modifying factors: none.  Associated symptoms: none.  Patient has no other skin complaints today.  Remainder of the HPI, Meds, PMH, Allergies, FH, and SH was reviewed in chart.    Pertinent Hx:   No personal or family history of skin cancer    Past Medical History:   Diagnosis Date     Allergic rhinitis     on immunotherapy until      Chronic eustachian tube dysfunction      Chronic tonsillitis     tonsillectomy     Mixed hearing loss, bilateral     mild     Mixed hyperlipidemia 2007    mild LDL elevation     Supervision of other normal pregnancy      - vaginal       Past Surgical History:   Procedure Laterality Date     ADENOIDECTOMY       EYE SURGERY      Lasik     PE TUBES       TONSILLECTOMY  2007    Bilateral tonsillectomy        Family History   Problem Relation Age of Onset     Genitourinary Problems Mother         hysterectomy for benign disease     Lipids Mother      Hypertension Mother      Hyperlipidemia Mother      Asthma Mother      Lipids Father      Hyperlipidemia Father      Blood Disease Maternal Grandmother         low B12     Depression Maternal Grandmother         depression     Thyroid Disease Maternal Grandmother         hypothyroidism     C.A.D. Maternal Grandfather         sudden death, mid 50s, smoker     Cerebrovascular Disease Paternal Grandfather         onset age 70s     Lipids Sister      Genitourinary  Problems Sister         kidney stones     Cardiovascular Maternal Aunt         DVT legs     Hyperlipidemia Sister      Breast Cancer No family hx of      Colon Cancer No family hx of        Social History     Socioeconomic History     Marital status:      Spouse name: Laron     Number of children: 0     Years of education: 16     Highest education level: Not on file   Occupational History     Occupation:      Employer: [employer]   Tobacco Use     Smoking status: Never Smoker     Smokeless tobacco: Never Used   Substance and Sexual Activity     Alcohol use: No     Comment: 1 per month     Drug use: No     Sexual activity: Yes     Partners: Male     Birth control/protection: Pill     Comment:  had a vasectomy   Other Topics Concern      Service No     Blood Transfusions No     Caffeine Concern No     Comment: 2-3 cans diet coke per week     Occupational Exposure No     Hobby Hazards Yes     Comment: tennis     Sleep Concern No     Stress Concern Yes     Comment: planning a wedding     Weight Concern No     Special Diet No     Comment: 1 yogurt per day     Back Care No     Exercise Yes     Comment: cardio for 60 min 4-5 days per week     Bike Helmet No     Seat Belt Yes     Self-Exams Yes     Parent/sibling w/ CABG, MI or angioplasty before 65F 55M? No   Social History Narrative    Lives with .  2 kids, non smoker  7/2008.  Alcohol social , working full time , Family in [facility].     Social Determinants of Health     Financial Resource Strain:      Difficulty of Paying Living Expenses:    Food Insecurity:      Worried About Running Out of Food in the Last Year:      Ran Out of Food in the Last Year:    Transportation Needs:      Lack of Transportation (Medical):      Lack of Transportation (Non-Medical):    Physical Activity:      Days of Exercise per Week:      Minutes of Exercise per Session:    Stress:      Feeling of Stress :    Social Connections:      Frequency  of Communication with Friends and Family:      Frequency of Social Gatherings with Friends and Family:      Attends [employer] Services:      Active Member of Clubs or Organizations:      Attends Club or Organization Meetings:      Marital Status:    Intimate Partner Violence:      Fear of Current or Ex-Partner:      Emotionally Abused:      Physically Abused:      Sexually Abused:        Outpatient Encounter Medications as of 7/16/2021   Medication Sig Dispense Refill     fluticasone (FLONASE) 50 MCG/ACT nasal spray Spray 1 spray into both nostrils daily       No facility-administered encounter medications on file as of 7/16/2021.       Review Of Systems:  Skin: mole  Eyes: negative  Ears/Nose/Throat: negative  Respiratory: No shortness of breath, dyspnea on exertion, cough, or hemoptysis  Cardiovascular: negative  Gastrointestinal: negative  Genitourinary: negative  Musculoskeletal: negative  Neurologic: negative  Psychiatric: negative  Hematologic/Lymphatic/Immunologic: negative  Endocrine: negative      Objective:     There were no vitals taken for this visit.  Eyes: Conjunctivae/lids: Normal   ENT: Lips:  Normal  MSK: Normal  Cardiovascular: Peripheral edema none  Pulm: Breathing Normal  Neuro/Psych: Orientation: A/O x 3. Normal; Mood/Affect: Normal, NAD, WDWN  Pt accompanied by: self  Following areas examined: Scalp, face, eyelids, lips, neck, chest, abdomen, back, and R&L upper and lower extremities,  buttock, hips, pt defers exam of groin and genitals.   Noyola skin type:ii   Findings:  Red smooth well-defined macules on trunk and extremities.  Brown, stuck-on scaly appearing papules on left thigh  Well circumscribed macules with symmetric color distribution on trunk and extremities.  Tan WD smooth macules on face, neck, trunk, and extremities.  borwn macule with dark brown center and irregular borders on left lower quadrant 0.4cm  Dark brown macule on left lateral thigh 0.25 cm-pictures taken  Brown  macule on left deltoid 0.5cm-pictures taken    Assessment and Plan:     1) Cherry angiomas, Seborrheic keratoses, Benign nevi, Lentigines     I discussed the specifics of tumor, prognosis, and genetics of benign lesions.  I explained that treatment of these lesions would be purely cosmetic and not medically neccessary.  I discussed with patient different removal options including excision, cryotherapy, cautery and /or laser.  Lesion may recur and/or may not completely resolve. May need additional treatment.   Benign nevus left lateral thigh 0.25 cm-pictures taken  Benign nevus left deltoid 0.5cm-pictures taken  Watch and monitor  2) Neoplasm of uncertain behavior left lower quadrant 0.4cm  Atypical nevus vs MIS  TANGENTIAL BIOPSY:  After consent, anesthesia with LEC and prep, tangential biopsy performed.  No complications and routine wound care.  May grow back and will get a scar. Based on lesion type may need to completely remove lesion. Patient will be notified in 7-10 days of results. Wound care directions given.    Signs and Symptoms of non-melanoma skin cancer and ABCDEs of melanoma reviewed with patient. Patient encouraged to perform monthly self skin exams and educated on how to perform them. UV precautions reviewed with patient. Patient was asked about new or changing moles/lesions on body.   Wear a sunscreen with at least SPF 30 on your face, ears, neck and V of the chest daily. Wear sunscreen on other areas of the body if those areas are exposed to the sun throughout the day. Sunscreens can contain physical and/or chemical blockers. Physical blockers are less likely to clog pores, these include zinc oxide and titanium dioxide. Reapply every two hour and after swimming. Sunscreen examples include Neutrogena, CeraVe, Blue Lizard, Elta MD and many others.    Proper skin care from [facility]:    -Eliminate harsh soaps as they strip the natural oils from the skin, often resulting in dry itchy skin (  i.e. Dial, Zest, [place])  -Use mild soaps such as Cetaphil or Dove Sensitive Skin in the shower. You do not need to use soap on arms, legs, and trunk every time you shower unless visibly soiled.   -Avoid hot or cold showers.  -After showering, lightly dry off and apply moisturizing within 2-3 minutes. This will help trap moisture in the skin.   -Aggressive use of a moisturizer at least 1-2 times a day to the entire body (including -Vanicream, Cetaphil, Aquaphor or Cerave) and moisturize hands after every washing.  -We recommend using moisturizers that come in a tub that needs to be scooped out, not a pump. This has more of an oil base. It will hold moisture in your skin much better than a water base moisturizer. The above recommended are non-pore clogging.         It was a pleasure speaking with Monica Quesada today.       Follow up in yearly FBE        Again, thank you for allowing me to participate in the care of your patient.        Sincerely,        Neelam Weaver PA-C

## 2021-07-16 NOTE — PROGRESS NOTES
HPI:  Monica Quesada is a 40 year old female patient here today for mole on abdomen .  Patient states this has been present for years.  Patient reports the following symptoms: none .  Patient reports the following previous treatments: none.  Patient reports the following modifying factors: none.  Associated symptoms: none.  Patient has no other skin complaints today.  Remainder of the HPI, Meds, PMH, Allergies, FH, and SH was reviewed in chart.    Pertinent Hx:   No personal or family history of skin cancer    Past Medical History:   Diagnosis Date     Allergic rhinitis     on immunotherapy until      Chronic eustachian tube dysfunction      Chronic tonsillitis     tonsillectomy     Mixed hearing loss, bilateral     mild     Mixed hyperlipidemia     mild LDL elevation     Supervision of other normal pregnancy      - vaginal       Past Surgical History:   Procedure Laterality Date     ADENOIDECTOMY       EYE SURGERY      Lasik     PE TUBES       TONSILLECTOMY  2007    Bilateral tonsillectomy        Family History   Problem Relation Age of Onset     Genitourinary Problems Mother         hysterectomy for benign disease     Lipids Mother      Hypertension Mother      Hyperlipidemia Mother      Asthma Mother      Lipids Father      Hyperlipidemia Father      Blood Disease Maternal Grandmother         low B12     Depression Maternal Grandmother         depression     Thyroid Disease Maternal Grandmother         hypothyroidism     C.A.D. Maternal Grandfather         sudden death, mid 50s, smoker     Cerebrovascular Disease Paternal Grandfather         onset age 70s     Lipids Sister      Genitourinary Problems Sister         kidney stones     Cardiovascular Maternal Aunt         DVT legs     Hyperlipidemia Sister      Breast Cancer No family hx of      Colon Cancer No family hx of        Social History     Socioeconomic History     Marital status:      Spouse name: Laron      Number of children: 0     Years of education: 16     Highest education level: Not on file   Occupational History     Occupation:      Employer: [employer]   Tobacco Use     Smoking status: Never Smoker     Smokeless tobacco: Never Used   Substance and Sexual Activity     Alcohol use: No     Comment: 1 per month     Drug use: No     Sexual activity: Yes     Partners: Male     Birth control/protection: Pill     Comment:  had a vasectomy   Other Topics Concern      Service No     Blood Transfusions No     Caffeine Concern No     Comment: 2-3 cans diet coke per week     Occupational Exposure No     Hobby Hazards Yes     Comment: tennis     Sleep Concern No     Stress Concern Yes     Comment: planning a wedding     Weight Concern No     Special Diet No     Comment: 1 yogurt per day     Back Care No     Exercise Yes     Comment: cardio for 60 min 4-5 days per week     Bike Helmet No     Seat Belt Yes     Self-Exams Yes     Parent/sibling w/ CABG, MI or angioplasty before 65F 55M? No   Social History Narrative    Lives with .  2 kids, non smoker  7/2008.  Alcohol social , working full time , Family in [facility].     Social Determinants of Health     Financial Resource Strain:      Difficulty of Paying Living Expenses:    Food Insecurity:      Worried About Running Out of Food in the Last Year:      Ran Out of Food in the Last Year:    Transportation Needs:      Lack of Transportation (Medical):      Lack of Transportation (Non-Medical):    Physical Activity:      Days of Exercise per Week:      Minutes of Exercise per Session:    Stress:      Feeling of Stress :    Social Connections:      Frequency of Communication with Friends and Family:      Frequency of Social Gatherings with Friends and Family:      Attends Mosque Services:      Active Member of Clubs or Organizations:      Attends Club or Organization Meetings:      Marital Status:    Intimate Partner Violence:       Fear of Current or Ex-Partner:      Emotionally Abused:      Physically Abused:      Sexually Abused:        Outpatient Encounter Medications as of 7/16/2021   Medication Sig Dispense Refill     fluticasone (FLONASE) 50 MCG/ACT nasal spray Spray 1 spray into both nostrils daily       No facility-administered encounter medications on file as of 7/16/2021.       Review Of Systems:  Skin: mole  Eyes: negative  Ears/Nose/Throat: negative  Respiratory: No shortness of breath, dyspnea on exertion, cough, or hemoptysis  Cardiovascular: negative  Gastrointestinal: negative  Genitourinary: negative  Musculoskeletal: negative  Neurologic: negative  Psychiatric: negative  Hematologic/Lymphatic/Immunologic: negative  Endocrine: negative      Objective:     There were no vitals taken for this visit.  Eyes: Conjunctivae/lids: Normal   ENT: Lips:  Normal  MSK: Normal  Cardiovascular: Peripheral edema none  Pulm: Breathing Normal  Neuro/Psych: Orientation: A/O x 3. Normal; Mood/Affect: Normal, NAD, WDWN  Pt accompanied by: self  Following areas examined: Scalp, face, eyelids, lips, neck, chest, abdomen, back, and R&L upper and lower extremities,  buttock, hips, pt defers exam of groin and genitals.   Noyola skin type:ii   Findings:  Red smooth well-defined macules on trunk and extremities.  Brown, stuck-on scaly appearing papules on left thigh  Well circumscribed macules with symmetric color distribution on trunk and extremities.  Tan WD smooth macules on face, neck, trunk, and extremities.  borwn macule with dark brown center and irregular borders on left lower quadrant 0.4cm  Dark brown macule on left lateral thigh 0.25 cm-pictures taken  Brown macule on left deltoid 0.5cm-pictures taken  Generalized flaking of skin of right cheek    Assessment and Plan:     1) Cherry angiomas, Seborrheic keratoses, Benign nevi, Lentigines     I discussed the specifics of tumor, prognosis, and genetics of benign lesions.  I explained  that treatment of these lesions would be purely cosmetic and not medically neccessary.  I discussed with patient different removal options including excision, cryotherapy, cautery and /or laser.  Lesion may recur and/or may not completely resolve. May need additional treatment.   Benign nevus left lateral thigh 0.25 cm-pictures taken  Benign nevus left deltoid 0.5cm-pictures taken  Watch and monitor  2) Neoplasm of uncertain behavior left lower quadrant 0.4cm  Atypical nevus vs MIS  TANGENTIAL BIOPSY:  After consent, anesthesia with LEC and prep, tangential biopsy performed.  No complications and routine wound care.  May grow back and will get a scar. Based on lesion type may need to completely remove lesion. Patient will be notified in 7-10 days of results. Wound care directions given.    3) xerosis +/- subclinical dermatitis?  Disc exfoliative washes such as salicylic acid and glycolic acid  Start vaseline and/or aquaphor  Apply a thin layer of triamcionlone to affected area 2x a day for 2 weeks. Tapering with improvement. Do not use on face or body folds.  Discussed side effects of topical steroids including but not limited to atrophy (skin thinning), striae (stretch marks) telangiectasias, steroid acne, and others. Do not apply to normal skin. Do not apply to discolored skin that does not have rash present. Educated patient on post inflammatory hyperpigmentation.     Signs and Symptoms of non-melanoma skin cancer and ABCDEs of melanoma reviewed with patient. Patient encouraged to perform monthly self skin exams and educated on how to perform them. UV precautions reviewed with patient. Patient was asked about new or changing moles/lesions on body.   Wear a sunscreen with at least SPF 30 on your face, ears, neck and V of the chest daily. Wear sunscreen on other areas of the body if those areas are exposed to the sun throughout the day. Sunscreens can contain physical and/or chemical blockers. Physical blockers are  less likely to clog pores, these include zinc oxide and titanium dioxide. Reapply every two hour and after swimming. Sunscreen examples include Neutrogena, CeraVe, Blue Lizard, Elta MD and many others.    Proper skin care from [facility]:    -Eliminate harsh soaps as they strip the natural oils from the skin, often resulting in dry itchy skin ( i.e. Dial, Zest, [place])  -Use mild soaps such as Cetaphil or Dove Sensitive Skin in the shower. You do not need to use soap on arms, legs, and trunk every time you shower unless visibly soiled.   -Avoid hot or cold showers.  -After showering, lightly dry off and apply moisturizing within 2-3 minutes. This will help trap moisture in the skin.   -Aggressive use of a moisturizer at least 1-2 times a day to the entire body (including -Vanicream, Cetaphil, Aquaphor or Cerave) and moisturize hands after every washing.  -We recommend using moisturizers that come in a tub that needs to be scooped out, not a pump. This has more of an oil base. It will hold moisture in your skin much better than a water base moisturizer. The above recommended are non-pore clogging.         It was a pleasure speaking with Monica Quesada today.       Follow up in yearly FBE

## 2021-07-16 NOTE — PATIENT INSTRUCTIONS
Wound Care Instructions   Abdomen    FOR SUPERFICIAL WOUNDS     [facility] or     [facility] [phone]          AFTER 24 HOURS YOU SHOULD REMOVE THE BANDAGE AND BEGIN DAILY DRESSING CHANGES AS FOLLOWS:     1) Remove Dressing.     2) Clean and dry the area with tap water using a Q-tip or sterile gauze pad.     3) Apply Vaseline, Aquaphor, Polysporin ointment or Bacitracin ointment over entire wound.  Do NOT use Neosporin ointment.     4) Cover the wound with a band-aid, or a sterile non-stick gauze pad and micropore paper tape      REPEAT THESE INSTRUCTIONS AT LEAST ONCE A DAY UNTIL THE WOUND HAS COMPLETELY HEALED.    It is an old wives tale that a wound heals better when it is exposed to air and allowed to dry out. The wound will heal faster with a better cosmetic result if it is kept moist with ointment and covered with a bandage.    **Do not let the wound dry out.**      Supplies Needed:      *Cotton tipped applicators (Q-tips)    *Polysporin Ointment or Bacitracin Ointment (NOT NEOSPORIN)    *Band-aids or non-stick gauze pads and micropore paper tape.      PATIENT INFORMATION:    During the healing process you will notice a number of changes. All wounds develop a small halo of redness surrounding the wound.  This means healing is occurring. Severe itching with extensive redness usually indicates sensitivity to the ointment or bandage tape used to dress the wound.  You should call our office if this develops.      Swelling  and/or discoloration around your surgical site is common, particularly when performed around the eye.    All wounds normally drain.  The larger the wound the more drainage there will be.  After 7-10 days, you will notice the wound beginning to shrink and new skin will begin to grow.  The wound is healed when you can see skin has formed over the entire area.  A healed wound has a healthy, shiny look to the surface and is red to dark pink in color to  normalize.  Wounds may take approximately 4-6 weeks to heal.  Larger wounds may take 6-8 weeks.  After the wound is healed you may discontinue dressing changes.    You may experience a sensation of tightness as your wound heals. This is normal and will gradually subside.    Your healed wound may be sensitive to temperature changes. This sensitivity improves with time, but if you re having a lot of discomfort, try to avoid temperature extremes.    Patients frequently experience itching after their wound appears to have healed because of the continue healing under the skin.  Plain Vaseline will help relieve the itching.        POSSIBLE COMPLICATIONS    BLEEDIN. Leave the bandage in place.  2. Use tightly rolled up gauze or a cloth to apply direct pressure over the bandage for 30  minutes.  3. Reapply pressure for an additional 30 minutes if necessary  4. Use additional gauze and tape to maintain pressure once the bleeding has stopped.        Proper skin care from [facility]:    -Eliminate harsh soaps as they strip the natural oils from the skin, often resulting in dry itchy skin ( i.e. Dial, Zest, [place])  -Use mild soaps such as Cetaphil or Dove Sensitive Skin in the shower. You do not need to use soap on arms, legs, and trunk every time you shower unless visibly soiled.   -Avoid hot or cold showers.  -After showering, lightly dry off and apply moisturizing within 2-3 minutes. This will help trap moisture in the skin.   -Aggressive use of a moisturizer at least 1-2 times a day to the entire body (including -Vanicream, Cetaphil, Aquaphor or Cerave) and moisturize hands after every washing.  -We recommend using moisturizers that come in a tub that needs to be scooped out, not a pump. This has more of an oil base. It will hold moisture in your skin much better than a water base moisturizer. The above recommended are non-pore clogging.      Wear a sunscreen with at least SPF 30 on your face, ears,  neck and V of the chest daily. Wear sunscreen on other areas of the body if those areas are exposed to the sun throughout the day. Sunscreens can contain physical and/or chemical blockers. Physical blockers are less likely to clog pores, these include zinc oxide and titanium dioxide. Reapply every two hour and after swimming. Sunscreen examples include Neutrogena, CeraVe, Blue Lizard, Elta MD and many others.    UV radiation  UVA radiation remains constant throughout the day and throughout the year. It is a longer wavelength than UVB and therefore penetrates deeper into the skin leading to immediate and delayed tanning, photoaging, and skin cancer. 70-80% of UVA and UVB radiation occurs between the hours of 10am-2pm.  UVB radiation  UVB radiation causes the most harmful effects and is more significant during the summer months. However, snow and ice can reflect UVB radiation leading to skin damage during the winter months as well. UVB radiation is responsible for tanning, burning, inflammation, delayed erythema (pinkness), pigmentation (brown spots), and skin cancer.     I recommend self monthly full body exams and yearly full body exams with a dermatology provider. If you develop a new or changing lesion please follow up for examination. Most skin cancers are pink and scaly or pink and pearly. However, we do see blue/brown/black skin cancers.  Consider the ABCDEs of melanoma when giving yourself your monthly full body exam ( don't forget the groin, buttocks, feet, toes, etc). A-asymmetry, B-borders, C-color, D-diameter, E-elevation or evolving. If you see any of these changes please follow up in clinic. If you cannot see your back I recommend purchasing a hand held mirror to use with a larger wall mirror.

## 2021-07-28 LAB
PATH REPORT.COMMENTS IMP SPEC: NORMAL
PATH REPORT.COMMENTS IMP SPEC: NORMAL
PATH REPORT.FINAL DX SPEC: NORMAL
PATH REPORT.GROSS SPEC: NORMAL
PATH REPORT.MICROSCOPIC SPEC OTHER STN: NORMAL
PATH REPORT.RELEVANT HX SPEC: NORMAL

## 2021-07-28 PROCEDURE — 88342 IMHCHEM/IMCYTCHM 1ST ANTB: CPT | Performed by: DERMATOLOGY

## 2021-07-28 PROCEDURE — 88305 TISSUE EXAM BY PATHOLOGIST: CPT | Performed by: DERMATOLOGY

## 2021-09-25 ENCOUNTER — HEALTH MAINTENANCE LETTER (OUTPATIENT)
Age: 41
End: 2021-09-25

## 2022-07-06 ENCOUNTER — OFFICE VISIT (OUTPATIENT)
Dept: FAMILY MEDICINE | Facility: CLINIC | Age: 42
End: 2022-07-06
Payer: COMMERCIAL

## 2022-07-06 VITALS
RESPIRATION RATE: 14 BRPM | SYSTOLIC BLOOD PRESSURE: 96 MMHG | DIASTOLIC BLOOD PRESSURE: 63 MMHG | HEART RATE: 67 BPM | WEIGHT: 122 LBS | OXYGEN SATURATION: 98 % | HEIGHT: 63 IN | BODY MASS INDEX: 21.62 KG/M2 | TEMPERATURE: 97.5 F

## 2022-07-06 DIAGNOSIS — Z00.00 ROUTINE HISTORY AND PHYSICAL EXAMINATION OF ADULT: Primary | ICD-10-CM

## 2022-07-06 DIAGNOSIS — Z12.31 ENCOUNTER FOR SCREENING MAMMOGRAM FOR BREAST CANCER: ICD-10-CM

## 2022-07-06 DIAGNOSIS — Z13.6 CARDIOVASCULAR SCREENING; LDL GOAL LESS THAN 160: ICD-10-CM

## 2022-07-06 PROCEDURE — 82947 ASSAY GLUCOSE BLOOD QUANT: CPT | Performed by: FAMILY MEDICINE

## 2022-07-06 PROCEDURE — 80061 LIPID PANEL: CPT | Performed by: FAMILY MEDICINE

## 2022-07-06 PROCEDURE — 36415 COLL VENOUS BLD VENIPUNCTURE: CPT | Performed by: FAMILY MEDICINE

## 2022-07-06 PROCEDURE — 99396 PREV VISIT EST AGE 40-64: CPT | Performed by: FAMILY MEDICINE

## 2022-07-06 ASSESSMENT — ENCOUNTER SYMPTOMS
HEADACHES: 0
HEARTBURN: 0
ARTHRALGIAS: 0
MYALGIAS: 0
WEAKNESS: 0
SHORTNESS OF BREATH: 0
CHILLS: 0
SORE THROAT: 0
NERVOUS/ANXIOUS: 0
HEMATOCHEZIA: 0
EYE PAIN: 0
CONSTIPATION: 0
NAUSEA: 0
FREQUENCY: 0
COUGH: 0
PARESTHESIAS: 0
PALPITATIONS: 0
FEVER: 0
BREAST MASS: 0
HEMATURIA: 0
DIARRHEA: 0
JOINT SWELLING: 0
DIZZINESS: 0
DYSURIA: 0
ABDOMINAL PAIN: 0

## 2022-07-06 ASSESSMENT — PAIN SCALES - GENERAL: PAINLEVEL: NO PAIN (0)

## 2022-07-06 NOTE — PROGRESS NOTES
SUBJECTIVE:   CC: Monica Quesada is an 41 year old woman who presents for preventive health visit.     Patient taking Zyrtec for allergies      {Patient has been advised of split billing requirements and indicates understanding: Yes  Healthy Habits:     Getting at least 3 servings of Calcium per day:  Yes    Bi-annual eye exam:  Yes    Dental care twice a year:  Yes    Sleep apnea or symptoms of sleep apnea:  None    Diet:  Regular (no restrictions)    Frequency of exercise:  4-5 days/week    Duration of exercise:  30-45 minutes    Taking medications regularly:  Yes    Medication side effects:  Not applicable    PHQ-2 Total Score: 0    Additional concerns today:  No        PROBLEMS TO ADD ON...    Today's PHQ-2 Score:   PHQ-2 ( 1999 [employer]) 7/6/2022   Q1: Little interest or pleasure in doing things 0   Q2: Feeling down, depressed or hopeless 0   PHQ-2 Score 0   PHQ-2 Total Score (12-17 Years)- Positive if 3 or more points; Administer PHQ-A if positive -   Q1: Little interest or pleasure in doing things Not at all   Q2: Feeling down, depressed or hopeless Not at all   PHQ-2 Score 0       Abuse: Current or Past (Physical, Sexual or Emotional) - No  Do you feel safe in your environment? Yes        Social History     Tobacco Use     Smoking status: Never Smoker     Smokeless tobacco: Never Used   Substance Use Topics     Alcohol use: No     Comment: 1 per month         Alcohol Use 7/6/2022   Prescreen: >3 drinks/day or >7 drinks/week? No   Prescreen: >3 drinks/day or >7 drinks/week? -     Reviewed orders with patient.  Reviewed health maintenance and updated orders accordingly - Yes  Patient Active Problem List   Diagnosis     Encounter for surveillance of contraceptives     CARDIOVASCULAR SCREENING; LDL GOAL LESS THAN 160     Past Surgical History:   Procedure Laterality Date     ADENOIDECTOMY  1990     EYE SURGERY  2016    Lasik     PE TUBES  1990     TONSILLECTOMY  4/2007    Bilateral tonsillectomy        Social History     Tobacco Use     Smoking status: Never Smoker     Smokeless tobacco: Never Used   Substance Use Topics     Alcohol use: No     Comment: 1 per month     Family History   Problem Relation Age of Onset     Genitourinary Problems Mother         hysterectomy for benign disease     Lipids Mother      Hypertension Mother      Hyperlipidemia Mother      Asthma Mother      Lipids Father      Hyperlipidemia Father      Lipids Sister      Genitourinary Problems Sister         kidney stones     Hyperlipidemia Sister      Blood Disease Maternal Grandmother         low B12     Depression Maternal Grandmother         depression     Thyroid Disease Maternal Grandmother         hypothyroidism     C.A.D. Maternal Grandfather         sudden death, mid 50s, smoker     Cerebrovascular Disease Paternal Grandfather         onset age 70s     Cardiovascular Maternal Aunt         DVT legs     Breast Cancer No family hx of      Colon Cancer No family hx of      Coronary Artery Disease No family hx of            Breast Cancer Screening:    Breast CA Risk Assessment (FHS-7) 7/6/2022   Do you have a family history of breast, colon, or ovarian cancer? No / Unknown       click delete button to remove this line now  Mammogram Screening - Offered annual screening and updated Health Maintenance for mutual plan based on risk factor consideration    Pertinent mammograms are reviewed under the imaging tab.    History of abnormal Pap smear: NO - age 30-65 PAP every 5 years with negative HPV co-testing recommended  PAP / HPV Latest Ref Rng & Units 1/8/2018 8/29/2008 8/7/2007   PAP (Historical) - NIL NIL NIL   HPV16 NEG:Negative Negative - -   HPV18 NEG:Negative Negative - -   HRHPV NEG:Negative Negative - -     Reviewed and updated as needed this visit by clinical staff   Tobacco  Allergies  Meds                Reviewed and updated as needed this visit by Provider                   Past Medical History:   Diagnosis Date     Allergic  rhinitis     on immunotherapy until      Chronic eustachian tube dysfunction      Chronic tonsillitis 2007    tonsillectomy     Mixed hearing loss, bilateral     mild     Mixed hyperlipidemia 2007    mild LDL elevation     Supervision of other normal pregnancy      - vaginal        Review of Systems   Constitutional: Negative for chills and fever.   HENT: Negative for congestion, ear pain, hearing loss and sore throat.    Eyes: Negative for pain and visual disturbance.   Respiratory: Negative for cough and shortness of breath.    Cardiovascular: Negative for chest pain, palpitations and peripheral edema.   Gastrointestinal: Negative for abdominal pain, constipation, diarrhea, heartburn, hematochezia and nausea.   Breasts:  Negative for tenderness, breast mass and discharge.   Genitourinary: Negative for dysuria, frequency, genital sores, hematuria, pelvic pain, urgency, vaginal bleeding and vaginal discharge.   Musculoskeletal: Negative for arthralgias, joint swelling and myalgias.   Skin: Negative for rash.   Neurological: Negative for dizziness, weakness, headaches and paresthesias.   Psychiatric/Behavioral: Negative for mood changes. The patient is not nervous/anxious.      CONSTITUTIONAL: NEGATIVE for fever, chills, change in weight  INTEGUMENTARU/SKIN: NEGATIVE for worrisome rashes, moles or lesions  EYES: NEGATIVE for vision changes or irritation  ENT: NEGATIVE for ear, mouth and throat problems  RESP: NEGATIVE for significant cough or SOB  BREAST: NEGATIVE for masses, tenderness or discharge  CV: NEGATIVE for chest pain, palpitations or peripheral edema  GI: NEGATIVE for nausea, abdominal pain, heartburn, or change in bowel habits  : NEGATIVE for unusual urinary or vaginal symptoms. Periods are regular.  MUSCULOSKELETAL: NEGATIVE for significant arthralgias or myalgia  NEURO: NEGATIVE for weakness, dizziness or paresthesias  ENDOCRINE: NEGATIVE for temperature intolerance, skin/hair  "changes  HEME/ALLERGY/IMMUNE: NEGATIVE for bleeding problems  PSYCHIATRIC: NEGATIVE for changes in mood or affect     OBJECTIVE:   BP 96/63   Pulse 67   Temp 97.5  F (36.4  C) (Tympanic)   Resp 14   Ht 1.595 m (5' 2.8\")   Wt 55.3 kg (122 lb)   LMP 06/24/2022 (Exact Date)   SpO2 98%   BMI 21.75 kg/m    Physical Exam  GENERAL APPEARANCE: healthy, alert and no distress  EYES: Eyes grossly normal to inspection, PERRL and conjunctivae and sclerae normal  HENT: ear canals and TM's normal, nose and mouth without ulcers or lesions, oropharynx clear and oral mucous membranes moist  NECK: no adenopathy, no asymmetry, masses, or scars and thyroid normal to palpation  RESP: lungs clear to auscultation - no rales, rhonchi or wheezes  BREAST: normal without masses, tenderness or nipple discharge and no palpable axillary masses or adenopathy  CV: regular rate and rhythm, normal S1 S2, no S3 or S4, no murmur, click or rub, no peripheral edema and peripheral pulses strong  ABDOMEN: soft, nontender, no hepatosplenomegaly, no masses and bowel sounds normal  MS: no musculoskeletal defects are noted and gait is age appropriate without ataxia  SKIN: no suspicious lesions or rashes  NEURO: Normal strength and tone, sensory exam grossly normal, mentation intact and speech normal  PSYCH: mentation appears normal and affect normal/bright        ASSESSMENT/PLAN:     (Z00.00) Routine history and physical examination of adult  (primary encounter diagnosis)  Comment:   Plan: Lipid panel reflex to direct LDL Fasting,         Glucose            (Z13.6) CARDIOVASCULAR SCREENING; LDL GOAL LESS THAN 160  Comment:   Plan:     (Z12.31) Encounter for screening mammogram for breast cancer  Comment:   Plan: *MA Screening Digital Bilateral                Check labs..Cares and  treatment discussed.  follow up if problem   Patient expressed understanding and agreement with treatment plan. All patient's questions were answered, will let me know if has " "more later.  Medications: Rx's: Reviewed the potential side effects/complications of medications prescribed.     COUNSELING:  Reviewed preventive health counseling, as reflected in patient instructions       Regular exercise       Healthy diet/nutrition       Vision screening       Immunizations    Vaccination UTD            Alcohol Use       Osteoporosis prevention/bone health       Consider Hep C screening for all patients one time for ages 18-79 years       Advance Care Planning    Estimated body mass index is 21.75 kg/m  as calculated from the following:    Height as of this encounter: 1.595 m (5' 2.8\").    Weight as of this encounter: 55.3 kg (122 lb).        She reports that she has never smoked. She has never used smokeless tobacco.      Counseling Resources:  ATP IV Guidelines  Pooled Cohorts Equation Calculator  Breast Cancer Risk Calculator  BRCA-Related Cancer Risk Assessment: FHS-7 Tool  FRAX Risk Assessment  ICSI Preventive Guidelines  Dietary Guidelines for Americans, 2010  USDA's MyPlate  ASA Prophylaxis  Lung CA Screening    Shelbie Lucero MD  [facility]  "

## 2022-07-07 LAB
CHOLEST SERPL-MCNC: 223 MG/DL
FASTING STATUS PATIENT QL REPORTED: YES
FASTING STATUS PATIENT QL REPORTED: YES
GLUCOSE BLD-MCNC: 92 MG/DL (ref 70–99)
HDLC SERPL-MCNC: 79 MG/DL
LDLC SERPL CALC-MCNC: 131 MG/DL
NONHDLC SERPL-MCNC: 144 MG/DL
TRIGL SERPL-MCNC: 67 MG/DL

## 2022-08-03 ENCOUNTER — ANCILLARY PROCEDURE (OUTPATIENT)
Dept: MAMMOGRAPHY | Facility: CLINIC | Age: 42
End: 2022-08-03
Attending: FAMILY MEDICINE
Payer: COMMERCIAL

## 2022-08-03 DIAGNOSIS — Z12.31 VISIT FOR SCREENING MAMMOGRAM: ICD-10-CM

## 2022-08-03 PROCEDURE — 77063 BREAST TOMOSYNTHESIS BI: CPT | Mod: TC | Performed by: RADIOLOGY

## 2022-08-03 PROCEDURE — 77067 SCR MAMMO BI INCL CAD: CPT | Mod: TC | Performed by: RADIOLOGY

## 2022-12-26 ENCOUNTER — HEALTH MAINTENANCE LETTER (OUTPATIENT)
Age: 42
End: 2022-12-26

## 2023-02-15 ENCOUNTER — APPOINTMENT (OUTPATIENT)
Dept: CT IMAGING | Facility: CLINIC | Age: 43
End: 2023-02-15
Attending: EMERGENCY MEDICINE
Payer: COMMERCIAL

## 2023-02-15 ENCOUNTER — NURSE TRIAGE (OUTPATIENT)
Dept: FAMILY MEDICINE | Facility: CLINIC | Age: 43
End: 2023-02-15
Payer: COMMERCIAL

## 2023-02-15 ENCOUNTER — HOSPITAL ENCOUNTER (EMERGENCY)
Facility: CLINIC | Age: 43
Discharge: HOME | End: 2023-02-15
Attending: STUDENT IN AN ORGANIZED HEALTH CARE EDUCATION/TRAINING PROGRAM | Admitting: STUDENT IN AN ORGANIZED HEALTH CARE EDUCATION/TRAINING PROGRAM
Payer: COMMERCIAL

## 2023-02-15 VITALS
SYSTOLIC BLOOD PRESSURE: 131 MMHG | TEMPERATURE: 99.6 F | RESPIRATION RATE: 15 BRPM | OXYGEN SATURATION: 99 % | DIASTOLIC BLOOD PRESSURE: 81 MMHG | HEART RATE: 67 BPM

## 2023-02-15 DIAGNOSIS — S09.90XA CLOSED HEAD INJURY, INITIAL ENCOUNTER: ICD-10-CM
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
## 2023-02-15 PROCEDURE — 250N000013 HC RX MED GY IP 250 OP 250 PS 637: Performed by: EMERGENCY MEDICINE

## 2023-02-15 PROCEDURE — 70450 CT HEAD/BRAIN W/O DYE: CPT

## 2023-02-15 PROCEDURE — 99284 EMERGENCY DEPT VISIT MOD MDM: CPT | Mod: 25

## 2023-02-15 RX ORDER — ACETAMINOPHEN 500 MG
1000 TABLET ORAL ONCE
Status: COMPLETED | OUTPATIENT
Start: 2023-02-15 | End: 2023-02-15

## 2023-02-15 RX ADMIN — ACETAMINOPHEN 1000 MG: 500 TABLET ORAL at 14:37

## 2023-02-15 ASSESSMENT — ACTIVITIES OF DAILY LIVING (ADL): ADLS_ACUITY_SCORE: 37

## 2023-02-15 NOTE — TELEPHONE ENCOUNTER
"Headache    Pt called stating she fell this AM- 15 minutes ago and fell on back of head. Pt states she experienced body convulsing for about a minute immediately after fall. No longer having convulsions. Pt states she has increasing h/a 6/10 with egg sized lump on back of head. Pt denies losing consciousness or n/v. Pt is A&Ox4. Pt states she is with 9 year old daughter at home and states that her parents are on there way and will be there shortly. Pt told to call 911 for immediate EMT assessment. Pt verbalized understanding.     Addendum: Called pt back to see if she was able to call 911. Pt stated that her parents came right after phone call with writer and they are currenty in transit to ED at [facility] now. Writer called ED and gave pt report.     Reason for Disposition    Seizure (convulsion) occurred  (Exception: Prior history of seizures and now alert and without Acute Neurologic Symptoms.)    Additional Information    Negative: ACUTE NEUROLOGIC SYMPTOM and symptom present now    Negative: Knocked out (unconscious) > 1 minute    Answer Assessment - Initial Assessment Questions  1. MECHANISM: \"How did the injury happen?\" For falls, ask: \"What height did you fall from?\" and \"What surface did you fall against?\"       Fall this morning, hit back of head on asphalt  2. ONSET: \"When did the injury happen?\" (Minutes or hours ago)       15 minutes ago    3. NEUROLOGIC SYMPTOMS: \"Was there any loss of consciousness?\" \"Are there any other neurological symptoms?\"       cunvulsions- body was shaking for about a minute. No loss of conciousness    4. MENTAL STATUS: \"Does the person know who they are, who you are, and where they are?\"       A&Ox4    5. LOCATION: \"What part of the head was hit?\"       Back of head    6. SCALP APPEARANCE: \"What does the scalp look like? Is it bleeding now?\" If Yes, ask: \"Is it difficult to stop?\"       No bleeding. Feels like a big goose egg on back of head    7. SIZE: For cuts, bruises, or " "swelling, ask: \"How large is it?\" (e.g., inches or centimeters)       Egg     8. PAIN: \"Is there any pain?\" If Yes, ask: \"How bad is it?\"  (e.g., Scale 1-10; or mild, moderate, severe)      H/a- 6/10    9. TETANUS: For any breaks in the skin, ask: \"When was the last tetanus booster?\"      Up to day   10. OTHER SYMPTOMS: \"Do you have any other symptoms?\" (e.g., neck pain, vomiting)        Headache    11. PREGNANCY: \"Is there any chance you are pregnant?\" \"When was your last menstrual period?\"        No    Protocols used: HEAD INJURY-A-OH    CALL  NOW:   * Immediate medical attention is needed. You need to hang up and call 911 (or an ambulance).   * Triager Discretion: I'll call you back in a few minutes to be sure you were able to reach them.      CALL BACK IF:  * Severe headache persists over 2 hours after ice pack and pain medications  * Extremity weakness or numbness occurs  * Slurred speech or blurred vision occurs  * Vomiting occurs  * You become worse        Patient/Caregiver understands and will follow care advice? Yes, plans to follow advice     Cynthia LINDA RN  [facility] Triage Team      "

## 2023-02-15 NOTE — ED TRIAGE NOTES
"Slipped and fell backwards on the ice today striking her head. Has swelling to back of head. Denies LOC but states she was \"convusling\" after the fall. I asked her if the fall was witnessed- it was not. When asked how she knew she was convulsing she said she could feel her body shaking. Does not take blood thinners, no hx of seizures. Denies n/v or vision changes.     Triage Assessment     Row Name 02/15/23 3550       Triage Assessment (Adult)    Airway WDL WDL       Respiratory WDL    Respiratory WDL WDL       Skin Circulation/Temperature WDL    Skin Circulation/Temperature WDL WDL       Cardiac WDL    Cardiac WDL WDL       Peripheral/Neurovascular WDL    Peripheral Neurovascular WDL WDL       Cognitive/Neuro/Behavioral WDL    Cognitive/Neuro/Behavioral WDL X    Level of Consciousness alert    Arousal Level opens eyes spontaneously    Orientation oriented x 4    Speech clear;spontaneous    Mood/Behavior cooperative;calm       Pupils (CN II)    Pupil PERRLA yes    Pupil Size Left 5 mm    Pupil Size Right 5 mm       Andreia Coma Scale    Best Eye Response 4-->(E4) spontaneous    Best Motor Response 6-->(M6) obeys commands    Best Verbal Response 5-->(V5) oriented    Andreia Coma Scale Score 15              "

## 2023-02-15 NOTE — ED PROVIDER NOTES
"  History     Chief Complaint:  Head Injury       HPI   Monica Quesada is a 42 year old female who presents with fall and head injury.  Patient reports that around 11:30 AM or 12 PM, she slipped and fell outside while she was going to the mailbox.  She hit the back of her head, and she reports that she was \"shaking uncontrollably,\" or \"convulsing.\"  She was aware during this time that she was shaking, however she was unable to stop shaking.  She denies any loss of consciousness, nausea, vomiting, or other injuries.  She denies blood thinners.      Independent Historian:   None - Patient Only    Review of External Notes: None     ROS:  Review of Systems    Allergies:  Pcn [Penicillins]  Sulfa Drugs     Medications:    No active medications    Past Medical History:    Past Medical History:   Diagnosis Date     Allergic rhinitis      Chronic eustachian tube dysfunction      Chronic tonsillitis 2007     Mixed hearing loss, bilateral 2014     Mixed hyperlipidemia 2007     Supervision of other normal pregnancy        Past Surgical History:    Past Surgical History:   Procedure Laterality Date     ADENOIDECTOMY  1990     EYE SURGERY  2016    Lasik     PE TUBES  1990     TONSILLECTOMY  4/2007    Bilateral tonsillectomy        Family History:    family history includes Asthma in her mother; Blood Disease in her maternal grandmother; C.A.D. in her maternal grandfather; Cardiovascular in her maternal aunt; Cerebrovascular Disease in her paternal grandfather; Depression in her maternal grandmother; Genitourinary Problems in her mother and sister; Hyperlipidemia in her father, mother, and sister; Hypertension in her mother; Lipids in her father, mother, and sister; Thyroid Disease in her maternal grandmother.    Social History:   reports that she has never smoked. She has never used smokeless tobacco. She reports that she does not drink alcohol and does not use drugs.  PCP: Shelbie Lucero     Physical Exam "     Patient Vitals for the past 24 hrs:   BP Temp Temp src Pulse Resp SpO2   02/15/23 1434 131/81 99.6  F (37.6  C) Temporal 67 15 99 %        Physical Exam  Vitals: Reviewed, as above.   General: Alert and oriented, in mild distress. Resting on bed.  Skin: Warm and well-perfused. No rashes, lesions, or erythema.   HEENT:   Head: 3 cm hematoma to occipital region of scalp.  No laceration.  No raccoon eyes or garcia sign.  Facial features symmetric.   Eyes: Conjunctiva pink, sclera white. EOMs grossly intact.   Ears: Auricles without lesion, erythema, or edema.  TMs with no hemotympanum.  Nose: Symmetric with no discharge.  Mouth and throat: Lips are moist with no chapping, lesions, or edema, Buccal mucosa is pink and moist without lesions. Oropharyngeal mucosa is pink and moist with no erythema, edema, or exudate. Uvula is midline.  Neck: Supple with no lymphadenopathy. Full ROM.   Pulmonary: Chest wall expansion symmetric with no increased work of breathing. Lungs clear to auscultation bilaterally.   Cardiovascular: Heart RRR with no murmurs.  Musculoskeletal: Moves all extremities spontaneously.  No midline spinal tenderness.  Neuro: Patient is alert and oriented to person place time.  Speech fluent with normal cognition.  Cranial nerves II through XII intact:   PERRL, EOMI, symmetric smile, equal eye squeeze and forehead raise, normal sensation in the V1 V2 V3 distribution, grossly equal hearing, midline tongue protrusion with normal side to side movement, full strength with head turn, normal shoulder shrug.  RUE strength 5/5: , elbow flexion/extension, wrist flexion/extension  LUE strength 5/5: , elbow flexion/extension, wrist flexion/extension  RLE strength 5/5: Ankle flexion/extension, knee flexion/extension, hip flexion/extension  LLE strength 5/5: Ankle flexion/extension, knee flexion/extension, hip flexion/extension  No pronator drift, normal rapid alternating movements normal finger-to-nose normal  "heel-to-shin.   Sensation intact and symmetric. Steady gait.  Psych: Affect appropriate.  Answers questions appropriately. Patient appears calm.      Emergency Department Course     Imaging:  CT Head w/o Contrast   Preliminary Result   IMPRESSION:    1. No CT findings of acute intracranial process.   2. Left occipital scalp hematoma without underlying calvarial   fracture.   3. Small amount of layering secretions in the left sphenoid sinus.   4. Small amount of fluid/membrane thickening in the left mastoid tip.         Report per radiology        Emergency Department Course & Assessments:             Interventions:  Medications   acetaminophen (TYLENOL) tablet 1,000 mg (1,000 mg Oral Given 2/15/23 2245)        Independent Interpretation (X-rays, CTs, rhythm strip):  Head CT with no hemorrhage     Consultations/Discussion of Management or Tests:  None        Social Determinants of Health affecting care:   None    Assessments:  1510 I evaluated the patient as described above.    Disposition:  The patient was discharged to home.     Impression & Plan      Medical Decision Making:  Monica Quesada is a 42 year old female who presents with fall and head injury.  Please see HPI and physical exam for full details.  Differential diagnosis includes ACS, skull fracture, concussion, seizure, among others.  Patient has a reassuring physical exam with no focal neurologic deficit.  She has a vague history of \"uncontrollable shaking\" after the event, and head CT was ordered from triage, which is thankfully negative for intracranial hemorrhage or fracture.  She has no other external signs of bleeding, and she is suitable for outpatient follow-up.  I did place a concussion clinic referral, should she have persistent symptoms.  Return precautions also provided, as well as discussion of avoidance of a second head impact in the near term.  She is comfortable with the plan.    Critical Care time:  was 0 minutes for this patient " excluding procedures.    Diagnosis:    ICD-10-CM    1. Closed head injury, initial encounter  S09.90XA Concussion  Referral      2. Fall, initial encounter  W19.XXXA                 Damaris Jang PA-C  02/15/23 1522

## 2023-08-11 ENCOUNTER — OFFICE VISIT (OUTPATIENT)
Dept: FAMILY MEDICINE | Facility: CLINIC | Age: 43
End: 2023-08-11
Payer: COMMERCIAL

## 2023-08-11 VITALS
WEIGHT: 124.8 LBS | SYSTOLIC BLOOD PRESSURE: 126 MMHG | RESPIRATION RATE: 14 BRPM | DIASTOLIC BLOOD PRESSURE: 72 MMHG | TEMPERATURE: 97.6 F | HEIGHT: 62 IN | HEART RATE: 73 BPM | BODY MASS INDEX: 22.97 KG/M2 | OXYGEN SATURATION: 99 %

## 2023-08-11 DIAGNOSIS — Z12.4 CERVICAL CANCER SCREENING: Primary | ICD-10-CM

## 2023-08-11 DIAGNOSIS — Z00.00 ROUTINE HISTORY AND PHYSICAL EXAMINATION OF ADULT: ICD-10-CM

## 2023-08-11 DIAGNOSIS — N92.4 EXCESSIVE BLEEDING IN PREMENOPAUSAL PERIOD: ICD-10-CM

## 2023-08-11 DIAGNOSIS — Z13.6 CARDIOVASCULAR SCREENING; LDL GOAL LESS THAN 160: ICD-10-CM

## 2023-08-11 LAB
ANION GAP SERPL CALCULATED.3IONS-SCNC: 11 MMOL/L (ref 7–15)
BASOPHILS # BLD AUTO: 0 10E3/UL (ref 0–0.2)
BASOPHILS NFR BLD AUTO: 1 %
BUN SERPL-MCNC: 15.4 MG/DL (ref 6–20)
CALCIUM SERPL-MCNC: 9.4 MG/DL (ref 8.6–10)
CHLORIDE SERPL-SCNC: 102 MMOL/L (ref 98–107)
CHOLEST SERPL-MCNC: 209 MG/DL
CREAT SERPL-MCNC: 0.7 MG/DL (ref 0.51–0.95)
DEPRECATED HCO3 PLAS-SCNC: 27 MMOL/L (ref 22–29)
EOSINOPHIL # BLD AUTO: 0.1 10E3/UL (ref 0–0.7)
EOSINOPHIL NFR BLD AUTO: 2 %
ERYTHROCYTE [DISTWIDTH] IN BLOOD BY AUTOMATED COUNT: 12.8 % (ref 10–15)
GFR SERPL CREATININE-BSD FRML MDRD: >90 ML/MIN/1.73M2
GLUCOSE SERPL-MCNC: 88 MG/DL (ref 70–99)
HCT VFR BLD AUTO: 43.3 % (ref 35–47)
HDLC SERPL-MCNC: 79 MG/DL
HGB BLD-MCNC: 14 G/DL (ref 11.7–15.7)
IMM GRANULOCYTES # BLD: 0 10E3/UL
IMM GRANULOCYTES NFR BLD: 0 %
LDLC SERPL CALC-MCNC: 115 MG/DL
LYMPHOCYTES # BLD AUTO: 2.1 10E3/UL (ref 0.8–5.3)
LYMPHOCYTES NFR BLD AUTO: 36 %
MCH RBC QN AUTO: 29.6 PG (ref 26.5–33)
MCHC RBC AUTO-ENTMCNC: 32.3 G/DL (ref 31.5–36.5)
MCV RBC AUTO: 92 FL (ref 78–100)
MONOCYTES # BLD AUTO: 0.4 10E3/UL (ref 0–1.3)
MONOCYTES NFR BLD AUTO: 6 %
NEUTROPHILS # BLD AUTO: 3.3 10E3/UL (ref 1.6–8.3)
NEUTROPHILS NFR BLD AUTO: 55 %
NONHDLC SERPL-MCNC: 130 MG/DL
PLATELET # BLD AUTO: 280 10E3/UL (ref 150–450)
POTASSIUM SERPL-SCNC: 4.1 MMOL/L (ref 3.4–5.3)
RBC # BLD AUTO: 4.73 10E6/UL (ref 3.8–5.2)
SODIUM SERPL-SCNC: 140 MMOL/L (ref 136–145)
TRIGL SERPL-MCNC: 73 MG/DL
WBC # BLD AUTO: 5.9 10E3/UL (ref 4–11)

## 2023-08-11 PROCEDURE — G0145 SCR C/V CYTO,THINLAYER,RESCR: HCPCS | Performed by: PHYSICIAN ASSISTANT

## 2023-08-11 PROCEDURE — 99213 OFFICE O/P EST LOW 20 MIN: CPT | Mod: 25 | Performed by: PHYSICIAN ASSISTANT

## 2023-08-11 PROCEDURE — 87624 HPV HI-RISK TYP POOLED RSLT: CPT | Performed by: PHYSICIAN ASSISTANT

## 2023-08-11 PROCEDURE — 36415 COLL VENOUS BLD VENIPUNCTURE: CPT | Performed by: PHYSICIAN ASSISTANT

## 2023-08-11 PROCEDURE — 80061 LIPID PANEL: CPT | Performed by: PHYSICIAN ASSISTANT

## 2023-08-11 PROCEDURE — 85025 COMPLETE CBC W/AUTO DIFF WBC: CPT | Performed by: PHYSICIAN ASSISTANT

## 2023-08-11 PROCEDURE — 80048 BASIC METABOLIC PNL TOTAL CA: CPT | Performed by: PHYSICIAN ASSISTANT

## 2023-08-11 PROCEDURE — 99396 PREV VISIT EST AGE 40-64: CPT | Performed by: PHYSICIAN ASSISTANT

## 2023-08-11 RX ORDER — CETIRIZINE HYDROCHLORIDE 10 MG/1
TABLET ORAL
COMMUNITY
Start: 2022-05-02

## 2023-08-11 ASSESSMENT — ENCOUNTER SYMPTOMS
PALPITATIONS: 0
SHORTNESS OF BREATH: 0
JOINT SWELLING: 0
CHILLS: 0
ARTHRALGIAS: 0
WEAKNESS: 0
CONSTIPATION: 0
NAUSEA: 0
HEMATURIA: 0
EYE PAIN: 0
COUGH: 0
MYALGIAS: 0
DIZZINESS: 0
SORE THROAT: 0
PARESTHESIAS: 0
NERVOUS/ANXIOUS: 0
FEVER: 0
HEMATOCHEZIA: 0
DIARRHEA: 0
HEARTBURN: 0
DYSURIA: 0
HEADACHES: 0
ABDOMINAL PAIN: 0
BREAST MASS: 0
FREQUENCY: 0

## 2023-08-11 ASSESSMENT — PAIN SCALES - GENERAL: PAINLEVEL: NO PAIN (0)

## 2023-08-11 NOTE — PROGRESS NOTES
SUBJECTIVE:   CC: Anjali is an 43 year old who presents for preventive health visit.       8/11/2023     9:15 AM   Additional Questions   Roomed by Sarah       Healthy Habits:     Getting at least 3 servings of Calcium per day:  Yes    Bi-annual eye exam:  Yes    Dental care twice a year:  Yes    Sleep apnea or symptoms of sleep apnea:  None    Diet:  Regular (no restrictions)    Frequency of exercise:  4-5 days/week    Duration of exercise:  45-60 minutes    Taking medications regularly:  Not Applicable    Medication side effects:  Not applicable    Additional concerns today:  No      Patient states overall health in the past year has been good.  Reports 1-2 days of heavy, painful vaginal bleeding at the beginning of her menses within the past year.  Was on OCPs for many years and discontinued them 2 years ago.  Had a history of heavy, painful periods when she was younger prior to taking OCPs where she would miss days from school.  Symptoms have gotten progressively worse.  Cycles typically are regular from 25-28 days.  Wears period underwear, a pad, and a regular tampon with changes q4-6 hours.  Sometimes bleeds through at night.  Treats with Tylenol, providing only mild relief.  Denies any fatigue, dizziness, or SOB.       Today's PHQ-2 Score:       8/11/2023     9:10 AM   PHQ-2 ( 1999 [employer])   Q1: Little interest or pleasure in doing things 0   Q2: Feeling down, depressed or hopeless 0   PHQ-2 Score 0   Q1: Little interest or pleasure in doing things Not at all   Q2: Feeling down, depressed or hopeless Not at all   PHQ-2 Score 0                 Social History     Tobacco Use    Smoking status: Never    Smokeless tobacco: Never   Substance Use Topics    Alcohol use: No     Comment: 1 per month         8/11/2023     9:10 AM   Alcohol Use   Prescreen: >3 drinks/day or >7 drinks/week? Not Applicable     Reviewed orders with patient.  Reviewed health maintenance and updated orders accordingly - Yes  Patient  Active Problem List   Diagnosis    Encounter for surveillance of contraceptives    CARDIOVASCULAR SCREENING; LDL GOAL LESS THAN 160    Excessive bleeding in premenopausal period     Past Surgical History:   Procedure Laterality Date    ADENOIDECTOMY      EYE SURGERY      Lasik    PE TUBES      TONSILLECTOMY  2007    Bilateral tonsillectomy       Social History     Tobacco Use    Smoking status: Never    Smokeless tobacco: Never   Substance Use Topics    Alcohol use: No     Comment: 1 per month     Family History   Problem Relation Age of Onset    Genitourinary Problems Mother         hysterectomy for benign disease    Lipids Mother     Hypertension Mother     Hyperlipidemia Mother     Asthma Mother     Lipids Father     Hyperlipidemia Father     Lipids Sister     Genitourinary Problems Sister         kidney stones    Hyperlipidemia Sister     Blood Disease Maternal Grandmother         low B12    Depression Maternal Grandmother         depression    Thyroid Disease Maternal Grandmother         hypothyroidism    C.A.D. Maternal Grandfather         sudden death, mid 50s, smoker    Cerebrovascular Disease Paternal Grandfather         onset age 70s    Cardiovascular Maternal Aunt         DVT legs    Breast Cancer No family hx of     Colon Cancer No family hx of     Coronary Artery Disease No family hx of          Current Outpatient Medications   Medication Sig Dispense Refill    cetirizine (ZYRTEC) 10 MG tablet       fluticasone (FLONASE) 50 MCG/ACT nasal spray Spray 1 spray into both nostrils daily       Allergies   Allergen Reactions    Pcn [Penicillins]      rash    Sulfa Antibiotics      rash       Breast Cancer Screenin/6/2022     9:17 AM   Breast CA Risk Assessment (FHS-7)   Do you have a family history of breast, colon, or ovarian cancer? No / Unknown         Mammogram Screening - Offered annual screening and updated Health Maintenance for mutual plan based on risk factor  consideration  Pertinent mammograms are reviewed under the imaging tab.    History of abnormal Pap smear: NO - age 30-65 PAP every 5 years with negative HPV co-testing recommended      Latest Ref Rng & Units 2018     8:51 AM 2018     8:26 AM 2008    12:00 AM   PAP / HPV   PAP (Historical)   NIL  NIL    HPV 16 DNA NEG^Negative Negative      HPV 18 DNA NEG^Negative Negative      Other HR HPV NEG^Negative Negative        Reviewed and updated as needed this visit by clinical staff   Tobacco  Allergies  Meds  Problems  Med Hx  Surg Hx  Fam Hx          Reviewed and updated as needed this visit by Provider   Tobacco   Meds  Problems  Med Hx  Surg Hx  Fam Hx         Past Medical History:   Diagnosis Date    Allergic rhinitis     on immunotherapy until     Chronic eustachian tube dysfunction     Chronic tonsillitis     tonsillectomy    Mixed hearing loss, bilateral     mild    Mixed hyperlipidemia     mild LDL elevation    Supervision of other normal pregnancy      - vaginal      Past Surgical History:   Procedure Laterality Date    ADENOIDECTOMY      EYE SURGERY      Lasik    PE TUBES      TONSILLECTOMY  2007    Bilateral tonsillectomy     OB History    Para Term  AB Living   2 2 1 1 0 2   SAB IAB Ectopic Multiple Live Births   0 0 0 0 2      # Outcome Date GA Lbr Jg/2nd Weight Sex Delivery Anes PTL Lv   2 Term 13 37w2d 05:20 / 00:41 2.92 kg (6 lb 7 oz) F Vag-Spont EPI N EARL      Name: JESSICA BARRIENTOS,VIANNEY MARLENE      Apgar1: 7  Apgar5: 9   1  02/20/10 36w0d   M  EPI  EARL       Review of Systems   Constitutional:  Negative for chills and fever.   HENT:  Negative for congestion, ear pain, hearing loss and sore throat.    Eyes:  Negative for pain and visual disturbance.   Respiratory:  Negative for cough and shortness of breath.    Cardiovascular:  Negative for chest pain, palpitations and peripheral edema.   Gastrointestinal:   "Negative for abdominal pain, constipation, diarrhea, heartburn, hematochezia and nausea.   Breasts:  Negative for tenderness, breast mass and discharge.   Genitourinary:  Negative for dysuria, frequency, genital sores, hematuria, pelvic pain, urgency, vaginal bleeding and vaginal discharge.   Musculoskeletal:  Negative for arthralgias, joint swelling and myalgias.   Skin:  Negative for rash.   Neurological:  Negative for dizziness, weakness, headaches and paresthesias.   Psychiatric/Behavioral:  Negative for mood changes. The patient is not nervous/anxious.           OBJECTIVE:   /72   Pulse 73   Temp 97.6  F (36.4  C) (Temporal)   Resp 14   Ht 1.575 m (5' 2\")   Wt 56.6 kg (124 lb 12.8 oz)   LMP 08/04/2023 (Exact Date)   SpO2 99%   BMI 22.83 kg/m    Physical Exam  GENERAL: healthy, alert and no distress  EYES: Eyes grossly normal to inspection, PERRL and conjunctivae and sclerae normal  HENT: ear canals normal, TM's sclerotic (hx recurrent otitis media, ear tubes), nose and mouth without ulcers or lesions  NECK: no adenopathy, no asymmetry, masses, or scars and thyroid normal to palpation  RESP: lungs clear to auscultation - no rales, rhonchi or wheezes  BREAST: normal without masses, tenderness or nipple discharge and no palpable axillary masses or adenopathy  CV: regular rate and rhythm, normal S1 S2, no S3 or S4, no murmur, click or rub, no peripheral edema and peripheral pulses strong  ABDOMEN: soft, nontender, no hepatosplenomegaly, no masses and bowel sounds normal  MS: no gross musculoskeletal defects noted, no edema  SKIN: no suspicious lesions or rashes  NEURO: Normal strength and tone, mentation intact and speech normal  PSYCH: mentation appears normal, affect normal/bright        ASSESSMENT/PLAN:   (Z12.4) Cervical cancer screening  (primary encounter diagnosis)  Plan: Pap Screen with HPV - recommended age 30 - 65         years    (Z00.00) Routine history and physical examination of " adult    (N92.4) Excessive bleeding in premenopausal period  Discussed treatment options such as treating pain with NSAIDs, resuming OCPs, IUD, or an endometrial ablation.  Patient was amenable to initial trial with NSAIDs for a couple of months to see how that does.  Patient was instructed to send a message if she decides to move forward with a pelvic ultrasound and/or GYN referral.   Plan: CBC with platelets and differential         (Z13.6) CARDIOVASCULAR SCREENING; LDL GOAL LESS THAN 160  Plan: Lipid Profile (Chol, Trig, HDL, LDL calc),         Basic metabolic panel  (Ca, Cl, CO2, Creat,         Gluc, K, Na, BUN)        COUNSELING:  Reviewed preventive health counseling, as reflected in patient instructions       Regular exercise       Healthy diet/nutrition        She reports that she has never smoked. She has never used smokeless tobacco.        JAZLYN Hilton [facility]

## 2023-08-16 LAB
BKR LAB AP GYN ADEQUACY: NORMAL
BKR LAB AP GYN INTERPRETATION: NORMAL
BKR LAB AP HPV REFLEX: NORMAL
BKR LAB AP PREVIOUS ABNORMAL: NORMAL
PATH REPORT.COMMENTS IMP SPEC: NORMAL
PATH REPORT.COMMENTS IMP SPEC: NORMAL
PATH REPORT.RELEVANT HX SPEC: NORMAL

## 2023-08-16 NOTE — RESULT ENCOUNTER NOTE
Monica-  Here are your recent results.     Your labs are normal with the exception of slightly elevated LDL cholesterol level.  Eating healthy and continuing to exercise regularly can help to improve this.    We can recheck labs in 1 year  Lamont Coburn PA-C

## 2023-08-18 LAB
HUMAN PAPILLOMA VIRUS 16 DNA: NEGATIVE
HUMAN PAPILLOMA VIRUS 18 DNA: NEGATIVE
HUMAN PAPILLOMA VIRUS FINAL DIAGNOSIS: NORMAL
HUMAN PAPILLOMA VIRUS OTHER HR: NEGATIVE

## 2023-09-19 ENCOUNTER — ANCILLARY PROCEDURE (OUTPATIENT)
Dept: MAMMOGRAPHY | Facility: CLINIC | Age: 43
End: 2023-09-19
Attending: FAMILY MEDICINE
Payer: COMMERCIAL

## 2023-09-19 DIAGNOSIS — Z12.31 VISIT FOR SCREENING MAMMOGRAM: ICD-10-CM

## 2023-09-19 PROCEDURE — 77067 SCR MAMMO BI INCL CAD: CPT | Mod: TC | Performed by: RADIOLOGY

## 2023-09-19 PROCEDURE — 77063 BREAST TOMOSYNTHESIS BI: CPT | Mod: TC | Performed by: RADIOLOGY

## 2024-09-25 ENCOUNTER — ANCILLARY PROCEDURE (OUTPATIENT)
Dept: MAMMOGRAPHY | Facility: CLINIC | Age: 44
End: 2024-09-25
Attending: FAMILY MEDICINE
Payer: COMMERCIAL

## 2024-09-25 DIAGNOSIS — Z12.31 VISIT FOR SCREENING MAMMOGRAM: ICD-10-CM

## 2024-09-25 PROCEDURE — 77063 BREAST TOMOSYNTHESIS BI: CPT | Mod: TC | Performed by: RADIOLOGY

## 2024-09-25 PROCEDURE — 77067 SCR MAMMO BI INCL CAD: CPT | Mod: TC | Performed by: RADIOLOGY

## 2024-10-01 ENCOUNTER — OFFICE VISIT (OUTPATIENT)
Dept: FAMILY MEDICINE | Facility: CLINIC | Age: 44
End: 2024-10-01
Payer: COMMERCIAL

## 2024-10-01 VITALS
BODY MASS INDEX: 22.86 KG/M2 | SYSTOLIC BLOOD PRESSURE: 102 MMHG | OXYGEN SATURATION: 98 % | WEIGHT: 129 LBS | RESPIRATION RATE: 10 BRPM | HEART RATE: 57 BPM | HEIGHT: 63 IN | TEMPERATURE: 98.4 F | DIASTOLIC BLOOD PRESSURE: 60 MMHG

## 2024-10-01 DIAGNOSIS — N92.0 MENORRHAGIA WITH REGULAR CYCLE: ICD-10-CM

## 2024-10-01 DIAGNOSIS — Z13.6 CARDIOVASCULAR SCREENING; LDL GOAL LESS THAN 160: ICD-10-CM

## 2024-10-01 DIAGNOSIS — R53.83 OTHER FATIGUE: ICD-10-CM

## 2024-10-01 DIAGNOSIS — Z00.00 ROUTINE HISTORY AND PHYSICAL EXAMINATION OF ADULT: Primary | ICD-10-CM

## 2024-10-01 LAB
ALBUMIN SERPL BCG-MCNC: 4.4 G/DL (ref 3.5–5.2)
ALP SERPL-CCNC: 71 U/L (ref 40–150)
ALT SERPL W P-5'-P-CCNC: 9 U/L (ref 0–50)
ANION GAP SERPL CALCULATED.3IONS-SCNC: 9 MMOL/L (ref 7–15)
AST SERPL W P-5'-P-CCNC: 23 U/L (ref 0–45)
BILIRUB SERPL-MCNC: 0.6 MG/DL
BUN SERPL-MCNC: 9.6 MG/DL (ref 6–20)
CALCIUM SERPL-MCNC: 9.3 MG/DL (ref 8.8–10.4)
CHLORIDE SERPL-SCNC: 104 MMOL/L (ref 98–107)
CHOLEST SERPL-MCNC: 189 MG/DL
CREAT SERPL-MCNC: 0.69 MG/DL (ref 0.51–0.95)
EGFRCR SERPLBLD CKD-EPI 2021: >90 ML/MIN/1.73M2
ERYTHROCYTE [DISTWIDTH] IN BLOOD BY AUTOMATED COUNT: 13.8 % (ref 10–15)
FASTING STATUS PATIENT QL REPORTED: YES
FASTING STATUS PATIENT QL REPORTED: YES
GLUCOSE SERPL-MCNC: 88 MG/DL (ref 70–99)
HCO3 SERPL-SCNC: 26 MMOL/L (ref 22–29)
HCT VFR BLD AUTO: 38.9 % (ref 35–47)
HDLC SERPL-MCNC: 67 MG/DL
HGB BLD-MCNC: 12.7 G/DL (ref 11.7–15.7)
LDLC SERPL CALC-MCNC: 107 MG/DL
MCH RBC QN AUTO: 29.9 PG (ref 26.5–33)
MCHC RBC AUTO-ENTMCNC: 32.6 G/DL (ref 31.5–36.5)
MCV RBC AUTO: 92 FL (ref 78–100)
NONHDLC SERPL-MCNC: 122 MG/DL
PLATELET # BLD AUTO: 237 10E3/UL (ref 150–450)
POTASSIUM SERPL-SCNC: 4.1 MMOL/L (ref 3.4–5.3)
PROT SERPL-MCNC: 7.2 G/DL (ref 6.4–8.3)
RBC # BLD AUTO: 4.25 10E6/UL (ref 3.8–5.2)
SODIUM SERPL-SCNC: 139 MMOL/L (ref 135–145)
TRIGL SERPL-MCNC: 77 MG/DL
TSH SERPL DL<=0.005 MIU/L-ACNC: 1.44 UIU/ML (ref 0.3–4.2)
VIT D+METAB SERPL-MCNC: 23 NG/ML (ref 20–50)
WBC # BLD AUTO: 5.7 10E3/UL (ref 4–11)

## 2024-10-01 PROCEDURE — 80061 LIPID PANEL: CPT | Performed by: FAMILY MEDICINE

## 2024-10-01 PROCEDURE — 85027 COMPLETE CBC AUTOMATED: CPT | Performed by: FAMILY MEDICINE

## 2024-10-01 PROCEDURE — 99396 PREV VISIT EST AGE 40-64: CPT | Performed by: FAMILY MEDICINE

## 2024-10-01 PROCEDURE — 36415 COLL VENOUS BLD VENIPUNCTURE: CPT | Performed by: FAMILY MEDICINE

## 2024-10-01 PROCEDURE — 84443 ASSAY THYROID STIM HORMONE: CPT | Performed by: FAMILY MEDICINE

## 2024-10-01 PROCEDURE — 82306 VITAMIN D 25 HYDROXY: CPT | Performed by: FAMILY MEDICINE

## 2024-10-01 PROCEDURE — 80053 COMPREHEN METABOLIC PANEL: CPT | Performed by: FAMILY MEDICINE

## 2024-10-01 PROCEDURE — 99213 OFFICE O/P EST LOW 20 MIN: CPT | Mod: 25 | Performed by: FAMILY MEDICINE

## 2024-10-01 SDOH — HEALTH STABILITY: PHYSICAL HEALTH: ON AVERAGE, HOW MANY MINUTES DO YOU ENGAGE IN EXERCISE AT THIS LEVEL?: 60 MIN

## 2024-10-01 SDOH — HEALTH STABILITY: PHYSICAL HEALTH: ON AVERAGE, HOW MANY DAYS PER WEEK DO YOU ENGAGE IN MODERATE TO STRENUOUS EXERCISE (LIKE A BRISK WALK)?: 6 DAYS

## 2024-10-01 ASSESSMENT — PAIN SCALES - GENERAL: PAINLEVEL: NO PAIN (0)

## 2024-10-01 ASSESSMENT — SOCIAL DETERMINANTS OF HEALTH (SDOH): HOW OFTEN DO YOU GET TOGETHER WITH FRIENDS OR RELATIVES?: TWICE A WEEK

## 2024-10-01 NOTE — PROGRESS NOTES
Preventive Care Visit  [facility]  Shelbie Lucero MD, Family Medicine  Oct 1, 2024      Assessment & Plan     (Z00.00) Routine history and physical examination of adult  (primary encounter diagnosis)  Comment:   Plan: REVIEW OF HEALTH MAINTENANCE PROTOCOL ORDERS,         Lipid panel reflex to direct LDL Fasting,         CANCELED: Glucose            (Z13.6) CARDIOVASCULAR SCREENING; LDL GOAL LESS THAN 160  Comment:   Plan:     (N92.0) Menorrhagia with regular cycle  Comment: concerned bc of her mom also had hysterectomy   Plan: CBC with platelets, US Pelvic Complete with         Transvaginal, TSH with free T4 reflex            Discussed possible differential diagnosis for her symptoms. Per pt she always had issue with painful heavy periods but she was on BCP and that had helped but now her sx are coming back again so concerned   Mom had hysterotomy bc of heavy periods and menstrual cycle issue so just worried. Talked about potential cause including fibroid, DUB, endometriosis etc    She wish to proceed with pelvic ultrasound. Check labs. Cares and symptomatic treatment discussed. She  follow up if any ongoing concerns or problem       (R53.83) Other fatigue  Comment:   Plan: CBC with platelets, TSH with free T4 reflex,         Comprehensive metabolic panel, Vitamin D         Deficiency          Discussed possible differential diagnosis for her symptoms. Check labs follow-up as needed       Check labs. refill sent.Cares and  treatment discussed follow. up if problem   Patient expressed understanding and agreement with treatment plan. All patient's questions were answered, will let me know if has more later.  Medications: Rx's: Reviewed the potential side effects/complications of medications prescribed.               Counseling  Appropriate preventive services were addressed with this patient via screening, questionnaire, or discussion as appropriate for fall prevention, nutrition,  physical activity, Tobacco-use cessation, social engagement, weight loss and cognition.  Checklist reviewing preventive services available has been given to the patient.  Reviewed patient's diet, addressing concerns and/or questions.       Regular exercise  See Patient Instructions    Subjective   Anjali is a 44 year old, presenting for the following:  Physical (Fasting )        10/1/2024     8:52 AM   Additional Questions   Roomed by Tod WILLARD        Health Care Directive  Patient does not have a Health Care Directive or Living Will: Pt states one is done but many years ago and needs to be updated     HPI  Her periods are somewhat heavy and painful  lately, especially  first few day,  last for 5 day mostly.   has bad  cramping for first  couple of days and she has to take OTC pain med's and it helps.   She has been  on BCP for  a long time  but now  had vasectomy so not taking BCP any more. She used to have heavy periods and really bad cramping nausea vomiting with her periods when she was younger although got somewhat better after taking BCP Mom also had hysterectomy for heavy periods at later age too,  so just concerned and wanted to get checked   She has been a bit more tired then usual. Tries to sleep well and eat heathy. Denies any unusual stress, mood is ok. No fever or chills , arthralgias myalgias etc otherwise             10/1/2024   General Health   How would you rate your overall physical health? Excellent   Feel stress (tense, anxious, or unable to sleep) Not at all            10/1/2024   Nutrition   Three or more servings of calcium each day? Yes   Diet: Regular (no restrictions)   How many servings of fruit and vegetables per day? (!) 2-3   How many sweetened beverages each day? 0-1            10/1/2024   Exercise   Days per week of moderate/strenous exercise 6 days   Average minutes spent exercising at this level 60 min            10/1/2024   Social Factors   Frequency of gathering with friends or  relatives Twice a week   Worry food won't last until get money to buy more No   Food not last or not have enough money for food? No   Do you have housing? (Housing is defined as stable permanent housing and does not include staying ouside in a car, in a tent, in an abandoned building, in an overnight shelter, or couch-surfing.) Yes   Are you worried about losing your housing? No   Lack of transportation? No   Unable to get utilities (heat,electricity)? No            10/1/2024   Dental   Dentist two times every year? Yes            10/1/2024   TB Screening   Were you born outside of the [place]? No            Today's PHQ-2 Score:       10/1/2024     8:45 AM   PHQ-2 ( 1999 Pfizer)   Q1: Little interest or pleasure in doing things 0   Q2: Feeling down, depressed or hopeless 0   PHQ-2 Score 0   Q1: Little interest or pleasure in doing things Not at all   Q2: Feeling down, depressed or hopeless Not at all   PHQ-2 Score 0           10/1/2024   Substance Use   Alcohol more than 3/day or more than 7/wk No   Do you use any other substances recreationally? No        Social History     Tobacco Use    Smoking status: Never    Smokeless tobacco: Never   Substance Use Topics    Alcohol use: No     Comment: 1 per month    Drug use: No           9/25/2024   LAST FHS-7 RESULTS   1st degree relative breast or ovarian cancer No   Any relative bilateral breast cancer No   Any male have breast cancer No   Any ONE woman have BOTH breast AND ovarian cancer No   Any woman with breast cancer before 50yrs No   2 or more relatives with breast AND/OR ovarian cancer No   2 or more relatives with breast AND/OR bowel cancer No                   10/1/2024   STI Screening   New sexual partner(s) since last STI/HIV test? No        History of abnormal Pap smear: No - age 30-64 HPV with reflex Pap every 5 years recommended        Latest Ref Rng & Units 8/11/2023    10:03 AM 1/8/2018     8:51 AM 1/8/2018     8:26 AM   PAP / HPV   PAP  Negative for  Intraepithelial Lesion or Malignancy (NILM)      PAP (Historical)    NIL    HPV 16 DNA Negative Negative  Negative     HPV 18 DNA Negative Negative  Negative     Other HR HPV Negative Negative  Negative       ASCVD Risk   The 10-year ASCVD risk score (Wicho MEYERS, et al., 2019) is: 0.3%    Values used to calculate the score:      Age: 44 years      Sex: Female      Is Non- : No      Diabetic: No      Tobacco smoker: No      Systolic Blood Pressure: 102 mmHg      Is BP treated: No      HDL Cholesterol: 79 mg/dL      Total Cholesterol: 209 mg/dL        10/1/2024   Contraception/Family Planning   Questions about contraception or family planning No           Reviewed and updated as needed this visit by Provider                    Patient Active Problem List   Diagnosis    Encounter for surveillance of contraceptives    CARDIOVASCULAR SCREENING; LDL GOAL LESS THAN 160    Excessive bleeding in premenopausal period     Past Surgical History:   Procedure Laterality Date    ADENOIDECTOMY  1990    EYE SURGERY  2016    Lasik    PE TUBES  1990    TONSILLECTOMY  4/2007    Bilateral tonsillectomy       Social History     Tobacco Use    Smoking status: Never    Smokeless tobacco: Never   Substance Use Topics    Alcohol use: No     Comment: 1 per month     Family History   Problem Relation Age of Onset    Genitourinary Problems Mother         hysterectomy for benign disease    Lipids Mother     Hypertension Mother     Hyperlipidemia Mother     Asthma Mother     Lipids Father     Hyperlipidemia Father     Lipids Sister     Genitourinary Problems Sister         kidney stones    Hyperlipidemia Sister     Blood Disease Maternal Grandmother         low B12    Depression Maternal Grandmother         depression    Thyroid Disease Maternal Grandmother         hypothyroidism    C.A.D. Maternal Grandfather         sudden death, mid 50s, smoker    Cerebrovascular Disease Paternal Grandfather         onset age  "70s    Cardiovascular Maternal Aunt         DVT legs    Breast Cancer No family hx of     Colon Cancer No family hx of     Coronary Artery Disease No family hx of              Review of Systems  CONSTITUTIONAL: NEGATIVE for fever, chills, change in weight  INTEGUMENTARY/SKIN: NEGATIVE for worrisome rashes, moles or lesions  EYES: NEGATIVE for vision changes or irritation  ENT/MOUTH: NEGATIVE for ear, mouth and throat problems  RESP: NEGATIVE for significant cough or SOB  BREAST: NEGATIVE for masses, tenderness or discharge  CV: NEGATIVE for chest pain, palpitations or peripheral edema  GI: NEGATIVE for nausea, abdominal pain, heartburn, or change in bowel habits  : NEGATIVE for frequency, dysuria, or hematuria  MUSCULOSKELETAL: NEGATIVE for significant arthralgias or myalgia  NEURO: NEGATIVE for weakness, dizziness or paresthesias  ENDOCRINE: NEGATIVE for temperature intolerance, skin/hair changes  HEME: NEGATIVE for bleeding problems  PSYCHIATRIC: NEGATIVE for changes in mood or affect     Objective    Exam  /60   Pulse 57   Temp 98.4  F (36.9  C) (Tympanic)   Resp 10   Ht 1.588 m (5' 2.52\")   Wt 58.5 kg (129 lb)   LMP 09/08/2024   SpO2 98%   BMI 23.20 kg/m     Estimated body mass index is 23.2 kg/m  as calculated from the following:    Height as of this encounter: 1.588 m (5' 2.52\").    Weight as of this encounter: 58.5 kg (129 lb).    Physical Exam  GENERAL: alert and no distress  EYES: Eyes grossly normal to inspection, PERRL and conjunctivae and sclerae normal  HENT: ear canals and TM's normal, nose and mouth without ulcers or lesions  NECK: no adenopathy, no asymmetry, masses, or scars  RESP: lungs clear to auscultation - no rales, rhonchi or wheezes  CV: regular rate and rhythm, normal S1 S2, no S3 or S4, no murmur, click or rub, no peripheral edema  ABDOMEN: soft, nontender, no hepatosplenomegaly, no masses and bowel sounds normal  MS: no gross musculoskeletal defects noted, no edema  SKIN: " no suspicious lesions or rashes  NEURO: Normal strength and tone, mentation intact and speech normal  PSYCH: mentation appears normal, affect normal/bright        Signed Electronically by: Shelbie Lucero MD